# Patient Record
Sex: MALE | Race: WHITE | NOT HISPANIC OR LATINO | URBAN - METROPOLITAN AREA
[De-identification: names, ages, dates, MRNs, and addresses within clinical notes are randomized per-mention and may not be internally consistent; named-entity substitution may affect disease eponyms.]

---

## 2023-09-01 ENCOUNTER — APPOINTMENT (OUTPATIENT)
Dept: RADIOLOGY | Facility: MEDICAL CENTER | Age: 68
DRG: 535 | End: 2023-09-01
Attending: EMERGENCY MEDICINE
Payer: MEDICARE

## 2023-09-01 ENCOUNTER — APPOINTMENT (OUTPATIENT)
Dept: RADIOLOGY | Facility: MEDICAL CENTER | Age: 68
DRG: 535 | End: 2023-09-01
Payer: MEDICARE

## 2023-09-01 ENCOUNTER — HOSPITAL ENCOUNTER (INPATIENT)
Facility: MEDICAL CENTER | Age: 68
LOS: 4 days | DRG: 535 | End: 2023-09-05
Attending: EMERGENCY MEDICINE | Admitting: SURGERY
Payer: MEDICARE

## 2023-09-01 DIAGNOSIS — S32.592A CLOSED BILATERAL FRACTURE OF PUBIC RAMI, INITIAL ENCOUNTER (HCC): ICD-10-CM

## 2023-09-01 DIAGNOSIS — S32.401A CLOSED NONDISPLACED FRACTURE OF RIGHT ACETABULUM, UNSPECIFIED PORTION OF ACETABULUM, INITIAL ENCOUNTER (HCC): ICD-10-CM

## 2023-09-01 DIAGNOSIS — K59.03 DRUG-INDUCED CONSTIPATION: ICD-10-CM

## 2023-09-01 DIAGNOSIS — S32.591A CLOSED BILATERAL FRACTURE OF PUBIC RAMI, INITIAL ENCOUNTER (HCC): ICD-10-CM
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
## 2023-09-01 DIAGNOSIS — T14.90XA TRAUMA: ICD-10-CM

## 2023-09-01 DIAGNOSIS — N50.1 MALE PELVIC HEMATOMA: ICD-10-CM

## 2023-09-01 DIAGNOSIS — S72.401B TYPE I OR II OPEN FRACTURE OF DISTAL END OF RIGHT FEMUR, UNSPECIFIED FRACTURE MORPHOLOGY, INITIAL ENCOUNTER (HCC): ICD-10-CM

## 2023-09-01 PROBLEM — I10 HYPERTENSION: Status: ACTIVE | Noted: 2023-09-01

## 2023-09-01 PROBLEM — T14.8XXA HEMATOMA: Status: ACTIVE | Noted: 2023-09-01

## 2023-09-01 PROBLEM — Z53.09 CONTRAINDICATION TO DEEP VEIN THROMBOSIS (DVT) PROPHYLAXIS: Status: ACTIVE | Noted: 2023-09-01

## 2023-09-01 PROBLEM — E78.5 HYPERLIPIDEMIA: Status: ACTIVE | Noted: 2023-09-01

## 2023-09-01 LAB
ABO + RH BLD: NORMAL
ABO GROUP BLD: NORMAL
ALBUMIN SERPL BCP-MCNC: 4.7 G/DL (ref 3.2–4.9)
ALBUMIN/GLOB SERPL: 1.6 G/DL
ALP SERPL-CCNC: 70 U/L (ref 30–99)
ALT SERPL-CCNC: 70 U/L (ref 2–50)
ANION GAP SERPL CALC-SCNC: 13 MMOL/L (ref 7–16)
AST SERPL-CCNC: 68 U/L (ref 12–45)
BILIRUB SERPL-MCNC: 1.2 MG/DL (ref 0.1–1.5)
BLD GP AB SCN SERPL QL: NORMAL
BUN SERPL-MCNC: 17 MG/DL (ref 8–22)
CALCIUM ALBUM COR SERPL-MCNC: 9.6 MG/DL (ref 8.5–10.5)
CALCIUM SERPL-MCNC: 10.2 MG/DL (ref 8.5–10.5)
CHLORIDE SERPL-SCNC: 100 MMOL/L (ref 96–112)
CO2 SERPL-SCNC: 25 MMOL/L (ref 20–33)
CREAT SERPL-MCNC: 0.91 MG/DL (ref 0.5–1.4)
ERYTHROCYTE [DISTWIDTH] IN BLOOD BY AUTOMATED COUNT: 43.2 FL (ref 35.9–50)
ETHANOL BLD-MCNC: <10.1 MG/DL
GFR SERPLBLD CREATININE-BSD FMLA CKD-EPI: 91 ML/MIN/1.73 M 2
GLOBULIN SER CALC-MCNC: 2.9 G/DL (ref 1.9–3.5)
GLUCOSE SERPL-MCNC: 114 MG/DL (ref 65–99)
HCT VFR BLD AUTO: 49.5 % (ref 42–52)
HGB BLD-MCNC: 15.3 G/DL (ref 14–18)
HGB BLD-MCNC: 17 G/DL (ref 14–18)
HGB BLD-MCNC: 17.2 G/DL (ref 14–18)
MCH RBC QN AUTO: 32.5 PG (ref 27–33)
MCHC RBC AUTO-ENTMCNC: 34.7 G/DL (ref 32.3–36.5)
MCV RBC AUTO: 93.6 FL (ref 81.4–97.8)
PLATELET # BLD AUTO: 230 K/UL (ref 164–446)
PMV BLD AUTO: 10.2 FL (ref 9–12.9)
POTASSIUM SERPL-SCNC: 3.7 MMOL/L (ref 3.6–5.5)
PROT SERPL-MCNC: 7.6 G/DL (ref 6–8.2)
RBC # BLD AUTO: 5.29 M/UL (ref 4.7–6.1)
RH BLD: NORMAL
SODIUM SERPL-SCNC: 138 MMOL/L (ref 135–145)
WBC # BLD AUTO: 10.4 K/UL (ref 4.8–10.8)

## 2023-09-01 PROCEDURE — 36415 COLL VENOUS BLD VENIPUNCTURE: CPT

## 2023-09-01 PROCEDURE — 82077 ASSAY SPEC XCP UR&BREATH IA: CPT

## 2023-09-01 PROCEDURE — 700111 HCHG RX REV CODE 636 W/ 250 OVERRIDE (IP): Performed by: SURGERY

## 2023-09-01 PROCEDURE — 71260 CT THORAX DX C+: CPT

## 2023-09-01 PROCEDURE — 96376 TX/PRO/DX INJ SAME DRUG ADON: CPT

## 2023-09-01 PROCEDURE — A9270 NON-COVERED ITEM OR SERVICE: HCPCS | Performed by: SURGERY

## 2023-09-01 PROCEDURE — 86901 BLOOD TYPING SEROLOGIC RH(D): CPT

## 2023-09-01 PROCEDURE — 99223 1ST HOSP IP/OBS HIGH 75: CPT | Mod: AI | Performed by: SURGERY

## 2023-09-01 PROCEDURE — 99222 1ST HOSP IP/OBS MODERATE 55: CPT | Performed by: ORTHOPAEDIC SURGERY

## 2023-09-01 PROCEDURE — 700117 HCHG RX CONTRAST REV CODE 255: Performed by: EMERGENCY MEDICINE

## 2023-09-01 PROCEDURE — 86900 BLOOD TYPING SEROLOGIC ABO: CPT

## 2023-09-01 PROCEDURE — 700105 HCHG RX REV CODE 258: Performed by: SURGERY

## 2023-09-01 PROCEDURE — 72170 X-RAY EXAM OF PELVIS: CPT

## 2023-09-01 PROCEDURE — 71045 X-RAY EXAM CHEST 1 VIEW: CPT

## 2023-09-01 PROCEDURE — G0390 TRAUMA RESPONS W/HOSP CRITI: HCPCS

## 2023-09-01 PROCEDURE — 86850 RBC ANTIBODY SCREEN: CPT

## 2023-09-01 PROCEDURE — 72131 CT LUMBAR SPINE W/O DYE: CPT

## 2023-09-01 PROCEDURE — 770022 HCHG ROOM/CARE - ICU (200)

## 2023-09-01 PROCEDURE — 700102 HCHG RX REV CODE 250 W/ 637 OVERRIDE(OP): Performed by: SURGERY

## 2023-09-01 PROCEDURE — 72128 CT CHEST SPINE W/O DYE: CPT

## 2023-09-01 PROCEDURE — 85018 HEMOGLOBIN: CPT

## 2023-09-01 PROCEDURE — 700111 HCHG RX REV CODE 636 W/ 250 OVERRIDE (IP): Performed by: EMERGENCY MEDICINE

## 2023-09-01 PROCEDURE — 85027 COMPLETE CBC AUTOMATED: CPT

## 2023-09-01 PROCEDURE — 99291 CRITICAL CARE FIRST HOUR: CPT

## 2023-09-01 PROCEDURE — 73551 X-RAY EXAM OF FEMUR 1: CPT | Mod: RT

## 2023-09-01 PROCEDURE — 80053 COMPREHEN METABOLIC PANEL: CPT

## 2023-09-01 PROCEDURE — 96374 THER/PROPH/DIAG INJ IV PUSH: CPT

## 2023-09-01 RX ORDER — ATORVASTATIN CALCIUM 40 MG/1
40 TABLET, FILM COATED ORAL DAILY
COMMUNITY

## 2023-09-01 RX ORDER — ACETAMINOPHEN 325 MG/1
650 TABLET ORAL EVERY 6 HOURS
Status: DISCONTINUED | OUTPATIENT
Start: 2023-09-01 | End: 2023-09-05 | Stop reason: HOSPADM

## 2023-09-01 RX ORDER — METOPROLOL TARTRATE 50 MG/1
50 TABLET, FILM COATED ORAL EVERY MORNING
COMMUNITY

## 2023-09-01 RX ORDER — AMOXICILLIN 250 MG
1 CAPSULE ORAL
Status: DISCONTINUED | OUTPATIENT
Start: 2023-09-01 | End: 2023-09-05 | Stop reason: HOSPADM

## 2023-09-01 RX ORDER — LISINOPRIL 10 MG/1
10 TABLET ORAL EVERY EVENING
Status: DISCONTINUED | OUTPATIENT
Start: 2023-09-01 | End: 2023-09-05 | Stop reason: HOSPADM

## 2023-09-01 RX ORDER — METAXALONE 800 MG/1
800 TABLET ORAL 3 TIMES DAILY
Status: DISCONTINUED | OUTPATIENT
Start: 2023-09-01 | End: 2023-09-05 | Stop reason: HOSPADM

## 2023-09-01 RX ORDER — HYDROMORPHONE HYDROCHLORIDE 1 MG/ML
INJECTION, SOLUTION INTRAMUSCULAR; INTRAVENOUS; SUBCUTANEOUS
Status: COMPLETED | OUTPATIENT
Start: 2023-09-01 | End: 2023-09-01

## 2023-09-01 RX ORDER — AMLODIPINE BESYLATE 10 MG/1
10 TABLET ORAL DAILY
COMMUNITY

## 2023-09-01 RX ORDER — LISINOPRIL 20 MG/1
20 TABLET ORAL EVERY MORNING
COMMUNITY

## 2023-09-01 RX ORDER — TRIAMTERENE AND HYDROCHLOROTHIAZIDE 37.5; 25 MG/1; MG/1
1 CAPSULE ORAL EVERY MORNING
Status: DISCONTINUED | OUTPATIENT
Start: 2023-09-01 | End: 2023-09-05 | Stop reason: HOSPADM

## 2023-09-01 RX ORDER — AMLODIPINE BESYLATE 10 MG/1
10 TABLET ORAL DAILY
Status: DISCONTINUED | OUTPATIENT
Start: 2023-09-01 | End: 2023-09-05 | Stop reason: HOSPADM

## 2023-09-01 RX ORDER — ENEMA 19; 7 G/133ML; G/133ML
1 ENEMA RECTAL
Status: DISCONTINUED | OUTPATIENT
Start: 2023-09-01 | End: 2023-09-05 | Stop reason: HOSPADM

## 2023-09-01 RX ORDER — DOCUSATE SODIUM 100 MG/1
100 CAPSULE, LIQUID FILLED ORAL 2 TIMES DAILY
Status: DISCONTINUED | OUTPATIENT
Start: 2023-09-01 | End: 2023-09-05 | Stop reason: HOSPADM

## 2023-09-01 RX ORDER — ACETAMINOPHEN 650 MG/1
650 SUPPOSITORY RECTAL EVERY 4 HOURS PRN
Status: DISCONTINUED | OUTPATIENT
Start: 2023-09-06 | End: 2023-09-05 | Stop reason: HOSPADM

## 2023-09-01 RX ORDER — HYDROMORPHONE HYDROCHLORIDE 1 MG/ML
0.5 INJECTION, SOLUTION INTRAMUSCULAR; INTRAVENOUS; SUBCUTANEOUS
Status: DISCONTINUED | OUTPATIENT
Start: 2023-09-01 | End: 2023-09-04

## 2023-09-01 RX ORDER — AMOXICILLIN 250 MG
1 CAPSULE ORAL NIGHTLY
Status: DISCONTINUED | OUTPATIENT
Start: 2023-09-01 | End: 2023-09-05 | Stop reason: HOSPADM

## 2023-09-01 RX ORDER — ONDANSETRON 2 MG/ML
4 INJECTION INTRAMUSCULAR; INTRAVENOUS EVERY 4 HOURS PRN
Status: DISCONTINUED | OUTPATIENT
Start: 2023-09-01 | End: 2023-09-05 | Stop reason: HOSPADM

## 2023-09-01 RX ORDER — TRIAMTERENE AND HYDROCHLOROTHIAZIDE 37.5; 25 MG/1; MG/1
1 CAPSULE ORAL EVERY MORNING
COMMUNITY

## 2023-09-01 RX ORDER — BISACODYL 10 MG
10 SUPPOSITORY, RECTAL RECTAL
Status: DISCONTINUED | OUTPATIENT
Start: 2023-09-01 | End: 2023-09-05 | Stop reason: HOSPADM

## 2023-09-01 RX ORDER — ACETAMINOPHEN 325 MG/1
650 TABLET ORAL EVERY 4 HOURS PRN
Status: DISCONTINUED | OUTPATIENT
Start: 2023-09-01 | End: 2023-09-01

## 2023-09-01 RX ORDER — ACETAMINOPHEN 325 MG/1
650 TABLET ORAL EVERY 4 HOURS PRN
Status: DISCONTINUED | OUTPATIENT
Start: 2023-09-06 | End: 2023-09-05 | Stop reason: HOSPADM

## 2023-09-01 RX ORDER — ACETAMINOPHEN 650 MG/1
650 SUPPOSITORY RECTAL EVERY 4 HOURS PRN
Status: DISCONTINUED | OUTPATIENT
Start: 2023-09-01 | End: 2023-09-01

## 2023-09-01 RX ORDER — METOPROLOL TARTRATE 50 MG/1
50 TABLET, FILM COATED ORAL EVERY MORNING
Status: DISCONTINUED | OUTPATIENT
Start: 2023-09-02 | End: 2023-09-05 | Stop reason: HOSPADM

## 2023-09-01 RX ORDER — OXYCODONE HYDROCHLORIDE 5 MG/1
5 TABLET ORAL
Status: DISCONTINUED | OUTPATIENT
Start: 2023-09-01 | End: 2023-09-05 | Stop reason: HOSPADM

## 2023-09-01 RX ORDER — POLYETHYLENE GLYCOL 3350 17 G/17G
1 POWDER, FOR SOLUTION ORAL 2 TIMES DAILY
Status: DISCONTINUED | OUTPATIENT
Start: 2023-09-01 | End: 2023-09-05 | Stop reason: HOSPADM

## 2023-09-01 RX ORDER — SODIUM CHLORIDE, SODIUM LACTATE, POTASSIUM CHLORIDE, CALCIUM CHLORIDE 600; 310; 30; 20 MG/100ML; MG/100ML; MG/100ML; MG/100ML
INJECTION, SOLUTION INTRAVENOUS CONTINUOUS
Status: DISCONTINUED | OUTPATIENT
Start: 2023-09-01 | End: 2023-09-04

## 2023-09-01 RX ORDER — ONDANSETRON 4 MG/1
4 TABLET, ORALLY DISINTEGRATING ORAL EVERY 4 HOURS PRN
Status: DISCONTINUED | OUTPATIENT
Start: 2023-09-01 | End: 2023-09-05 | Stop reason: HOSPADM

## 2023-09-01 RX ORDER — ACETAMINOPHEN 325 MG/1
650 TABLET ORAL EVERY 6 HOURS PRN
Status: DISCONTINUED | OUTPATIENT
Start: 2023-09-06 | End: 2023-09-05 | Stop reason: HOSPADM

## 2023-09-01 RX ORDER — LISINOPRIL 10 MG/1
10 TABLET ORAL EVERY EVENING
COMMUNITY

## 2023-09-01 RX ORDER — OXYCODONE HYDROCHLORIDE 10 MG/1
10 TABLET ORAL
Status: DISCONTINUED | OUTPATIENT
Start: 2023-09-01 | End: 2023-09-05 | Stop reason: HOSPADM

## 2023-09-01 RX ORDER — HYDROMORPHONE HYDROCHLORIDE 1 MG/ML
.5-1 INJECTION, SOLUTION INTRAMUSCULAR; INTRAVENOUS; SUBCUTANEOUS
Status: DISCONTINUED | OUTPATIENT
Start: 2023-09-01 | End: 2023-09-01

## 2023-09-01 RX ORDER — ATORVASTATIN CALCIUM 40 MG/1
40 TABLET, FILM COATED ORAL DAILY
Status: DISCONTINUED | OUTPATIENT
Start: 2023-09-02 | End: 2023-09-05 | Stop reason: HOSPADM

## 2023-09-01 RX ORDER — GABAPENTIN 100 MG/1
100 CAPSULE ORAL EVERY 8 HOURS
Status: DISCONTINUED | OUTPATIENT
Start: 2023-09-01 | End: 2023-09-05 | Stop reason: HOSPADM

## 2023-09-01 RX ORDER — LISINOPRIL 20 MG/1
20 TABLET ORAL EVERY MORNING
Status: DISCONTINUED | OUTPATIENT
Start: 2023-09-01 | End: 2023-09-05 | Stop reason: HOSPADM

## 2023-09-01 RX ADMIN — GABAPENTIN 100 MG: 100 CAPSULE ORAL at 13:58

## 2023-09-01 RX ADMIN — OXYCODONE HYDROCHLORIDE 10 MG: 10 TABLET ORAL at 22:46

## 2023-09-01 RX ADMIN — ACETAMINOPHEN 650 MG: 325 TABLET, FILM COATED ORAL at 13:59

## 2023-09-01 RX ADMIN — OXYCODONE HYDROCHLORIDE 10 MG: 10 TABLET ORAL at 16:18

## 2023-09-01 RX ADMIN — SODIUM CHLORIDE, POTASSIUM CHLORIDE, SODIUM LACTATE AND CALCIUM CHLORIDE: 600; 310; 30; 20 INJECTION, SOLUTION INTRAVENOUS at 13:57

## 2023-09-01 RX ADMIN — HYDROMORPHONE HYDROCHLORIDE 0.5 MG: 1 INJECTION, SOLUTION INTRAMUSCULAR; INTRAVENOUS; SUBCUTANEOUS at 11:29

## 2023-09-01 RX ADMIN — OXYCODONE HYDROCHLORIDE 10 MG: 10 TABLET ORAL at 19:22

## 2023-09-01 RX ADMIN — HYDROMORPHONE HYDROCHLORIDE 1 MG: 1 INJECTION, SOLUTION INTRAMUSCULAR; INTRAVENOUS; SUBCUTANEOUS at 12:42

## 2023-09-01 RX ADMIN — GABAPENTIN 100 MG: 100 CAPSULE ORAL at 21:23

## 2023-09-01 RX ADMIN — METAXALONE 800 MG: 800 TABLET ORAL at 17:47

## 2023-09-01 RX ADMIN — SODIUM CHLORIDE, POTASSIUM CHLORIDE, SODIUM LACTATE AND CALCIUM CHLORIDE: 600; 310; 30; 20 INJECTION, SOLUTION INTRAVENOUS at 21:26

## 2023-09-01 RX ADMIN — METAXALONE 800 MG: 800 TABLET ORAL at 13:58

## 2023-09-01 RX ADMIN — LISINOPRIL 20 MG: 20 TABLET ORAL at 13:59

## 2023-09-01 RX ADMIN — ACETAMINOPHEN 650 MG: 325 TABLET, FILM COATED ORAL at 23:27

## 2023-09-01 RX ADMIN — SENNOSIDES AND DOCUSATE SODIUM 1 TABLET: 50; 8.6 TABLET ORAL at 21:23

## 2023-09-01 RX ADMIN — IOHEXOL 100 ML: 350 INJECTION, SOLUTION INTRAVENOUS at 11:40

## 2023-09-01 RX ADMIN — HYDROMORPHONE HYDROCHLORIDE 0.5 MG: 1 INJECTION, SOLUTION INTRAMUSCULAR; INTRAVENOUS; SUBCUTANEOUS at 13:36

## 2023-09-01 RX ADMIN — ACETAMINOPHEN 650 MG: 325 TABLET, FILM COATED ORAL at 17:47

## 2023-09-01 ASSESSMENT — PAIN DESCRIPTION - PAIN TYPE
TYPE: ACUTE PAIN

## 2023-09-01 ASSESSMENT — PATIENT HEALTH QUESTIONNAIRE - PHQ9
SUM OF ALL RESPONSES TO PHQ9 QUESTIONS 1 AND 2: 0
2. FEELING DOWN, DEPRESSED, IRRITABLE, OR HOPELESS: NOT AT ALL
1. LITTLE INTEREST OR PLEASURE IN DOING THINGS: NOT AT ALL

## 2023-09-01 ASSESSMENT — FIBROSIS 4 INDEX: FIB4 SCORE: 2.4

## 2023-09-01 NOTE — ED NOTES
Med Rec completed per patient's wife and RX bottles (returned)   Allergies reviewed  No ORAL antibiotics in last 30 days    Patient takes Metoprolol Tartrate ONCE a day. This medications is prescribed as twice a day

## 2023-09-01 NOTE — PROGRESS NOTES
1330 patient arrived in ICU room 932. Patient received 0.5mg for 8 out of 10 pain.    VS:  HR 66  /69  Temp 98.7f, temporal  RR 13  SpO2 95%  Weight 85.3kg      4 Eyes Skin Assessment Completed by SANDY Bridges and Franca COYNE RN.    Head Blanching, pink  Ears Redness and Blanching  Nose WDL  Mouth WDL  Neck WDL  Breast/Chest Bruising right chest  Shoulder Blades Redness and Blanching  Spine WDL  (R) Arm/Elbow/Hand Abrasion abrasion right elbow  (L) Arm/Elbow/Hand  abrasion left elbow, hand  Abdomen WDL  Groin WDL  Scrotum/Coccyx/Buttocks:  Abrasion right buttocks, redness  (R) Leg WDL  (L) Leg Scar above L knee  (R) Heel/Foot/Toe WDL  (L) Heel/Foot/Toe WDL      Devices In Places Blood Pressure Cuff, Pulse Ox, SCD's, and Nasal Cannula      Interventions In Place Gray Ear Foams, NC W/Ear Foams, Sacral Mepilex, TAP System, Pillows, Q2 Turns, Low Air Loss Mattress, and ZFlo Pillow    Possible Skin Injury No    Pictures Uploaded Into Epic N/A  Wound Consult Placed N/A  RN Wound Prevention Protocol Ordered No

## 2023-09-01 NOTE — H&P
CHIEF COMPLAINT: Pelvic fractures after fall from horse.     HISTORY OF PRESENT ILLNESS: The patient is a 59+ year-old White man who was thrown from a horse and sustained injury to the pelvis.  He complains of pain there.  He is hemodynamically stable but CT scan shows a hematoma in the pelvis and active extravasation so trauma evaluation was requested.  Orthopedics has already been consulted.  Patient denies any low back pain, chest pain, shortness of breath, abdominal pain or nausea.  He denies any pain or numbness or weakness in the extremities.  No other complaints    TRIAGE CATEGORY: The patient was triaged as a Trauma Green Activation. An expeditious primary and secondary survey with required adjuncts was conducted. See Trauma Narrator for full details.    PAST MEDICAL HISTORY:  has a past medical history of Hypertension.    PAST SURGICAL HISTORY:  has no past surgical history on file.    ALLERGIES: No Known Allergies    CURRENT MEDICATIONS:   Home Medications       Reviewed by Medhat Lancaster D.O. (Physician) on 09/01/23 at 1306  Med List Status: Complete     Medication Last Dose Status   amLODIPine (NORVASC) 10 MG Tab 9/1/2023 Active   atorvastatin (LIPITOR) 40 MG Tab 9/1/2023 Active   lisinopril (PRINIVIL) 10 MG Tab 8/31/2023 Active   lisinopril (PRINIVIL) 20 MG Tab 9/1/2023 Active   metoprolol tartrate (LOPRESSOR) 50 MG Tab 9/1/2023 Active   multivitamin Tab 9/1/2023 Active   triamterene/hctz (MAXZIDE-25/DYAZIDE) 37.5-25 MG Cap 9/1/2023 Active                  FAMILY HISTORY: family history is not on file.    SOCIAL HISTORY:  reports current alcohol use. William Ohara reports that William Ohara does not use drugs.    REVIEW OF SYSTEMS: Review of systems is remarkable for the following lower abdominal and pelvic pain worsened with moving legs. The remainder of the comprehensive ROS is negative with the exception of the aforementioned HPI, PMH, and PSH bullets in accordance with CMS guideline.    PHYSICAL  "EXAMINATION:      Vital Signs: /67   Pulse 69   Temp 37.2 °C (99 °F) (Temporal)   Resp 18   Ht 1.702 m (5' 7\")   Wt 83.9 kg (185 lb)   SpO2 95%      Physical Exam  Vitals and nursing note reviewed.   HENT:      Head: Normocephalic and atraumatic.      Nose: Nose normal.      Mouth/Throat:      Mouth: Mucous membranes are moist.      Pharynx: Oropharynx is clear.   Eyes:      Extraocular Movements: Extraocular movements intact.      Conjunctiva/sclera: Conjunctivae normal.      Pupils: Pupils are equal, round, and reactive to light.   Cardiovascular:      Rate and Rhythm: Normal rate and regular rhythm.      Pulses: Normal pulses.   Pulmonary:      Effort: Pulmonary effort is normal.      Breath sounds: Normal breath sounds.   Chest:      Chest wall: No tenderness.   Abdominal:      General: There is no distension.      Palpations: Abdomen is soft.      Tenderness: There is no abdominal tenderness.   Musculoskeletal:         General: Normal range of motion.      Cervical back: Normal range of motion and neck supple. No tenderness.      Comments: Tenderness over the pubic bone area without instability or crepitus  No spine tenderness   Skin:     General: Skin is warm and dry.      Coloration: Skin is not pale.   Neurological:      General: No focal deficit present.      Mental Status: William Ohara is alert and oriented to person, place, and time.      Motor: No weakness.       LABORATORY VALUES:   Recent Labs     09/01/23  1128   WBC 10.4   RBC 5.29   HEMOGLOBIN 17.2   HEMATOCRIT 49.5   MCV 93.6   MCH 32.5   MCHC 34.7   RDW 43.2   PLATELETCT 230   MPV 10.2     Recent Labs     09/01/23  1128   SODIUM 138   POTASSIUM 3.7   CHLORIDE 100   CO2 25   GLUCOSE 114*   BUN 17   CREATININE 0.91   CALCIUM 10.2     Recent Labs     09/01/23  1128   ASTSGOT 68*   ALTSGPT 70*   TBILIRUBIN 1.2   ALKPHOSPHAT 70   GLOBULIN 2.9            IMAGING:   CT-LSPINE W/O PLUS RECONS   Final Result      CT of the lumbar spine without " contrast within normal limits.      CT-TSPINE W/O PLUS RECONS   Final Result      1.  Mild dextroscoliosis of the thoracic spine.      CT-CHEST,ABDOMEN,PELVIS WITH   Final Result      1.  No evidence of intrathoracic injury.   2.  No evidence of solid organ injury or bowel injury.   3.  Mild enlargement of the medial limb left adrenal gland, probably related to hyperplasia favored over injury.   4.  There are fractures of the right pubic ramus and inferior greater ring as well as the right acetabulum.   5.  There is associated pelvic hematoma with 2 small foci of areas of active bleeding along the superior posterior margin of the right pubic ramus.      6.  Findings were discussed with MUNIRA PERKINS on 9/1/2023 at 12:13 PM.      DX-FEMUR-1 VIEW RIGHT   Final Result      1.  Fracture of right-sided parasymphyseal pubic ramus.      2.  No evidence of right femur fracture.      DX-PELVIS-1 OR 2 VIEWS   Final Result      1.  Fracture of right-sided parasymphyseal pubic ramus      DX-CHEST-LIMITED (1 VIEW)   Final Result      No acute cardiopulmonary disease.          ASSESSMENT AND PLAN:   This is a male in his 60s with pubic ramus fractures and pelvic hematoma with small amount of extravasation.  No instability at this time.  So we will hold off on angio for now.  Admit to ICU for hemodynamic monitoring, serial exams and serial hemoglobins.  If there is any change in his status then we will discuss case with interventional radiology.  PT and OT have been ordered.  N.p.o. for now.  Hold Lovenox until tomorrow pending hemoglobin trend.  Patient does have hypertension and is on multiple agents so we will only reorder 1 and add them back on slowly as clinically appropriate.  Hematoma  Associated pelvic hematoma with 2 small foci of areas of active bleeding along the superior posterior margin of the right pubic ramus.  Serial laboratory studies and clinical exams.    Closed bilateral fracture of pubic rami (HCC)  There  are fractures of the right pubic ramus and inferior greater ring as well as the right acetabulum.  Non-operative management.  Weight bearing status - Definitive plan pending BLE.  Pedro Mason MD. Orthopedic Surgeon. [facility].    Trauma  Bucked from horse.  Trauma Green Activation.  Medhat Lancaster DO. Trauma Surgery.    Hypertension  Chronic condition treated with metoprolol, amlodipine, triamterene-hctz and lisinopril.  Metoprolol resumed at admission.  Holding maintenance medication during acute traumatic illness.    Hyperlipidemia  Chronic condition treated with atorvastatin.  Resumed maintenance medication on admission.    Contraindication to deep vein thrombosis (DVT) prophylaxis  VTE prophylaxis initially contraindicated secondary to elevated bleeding risk.  9/2 Trauma surveillance venous duplex ultrasonography ordered.    DISPOSITION: Trauma ICU.  Interval Trauma tertiary survey..         ____________________________________     Medhat Lancaster D.O.    DD: 9/1/2023  1:52 PM

## 2023-09-01 NOTE — ASSESSMENT & PLAN NOTE
VTE prophylaxis initially contraindicated secondary to elevated bleeding risk.  Interval Initiation of VTE Prophylaxis: 9/2 Prophylactic dose enoxaparin 30 mg BID initiated.

## 2023-09-01 NOTE — ASSESSMENT & PLAN NOTE
Fractures of the right pubic ramus and inferior greater ring as well as the right acetabulum.  Non-operative management.  Weight bearing status - Touch toe weightbearing RLE.  Pedro Mason MD. Orthopedic Surgeon. [facility].

## 2023-09-01 NOTE — ED PROVIDER NOTES
"ER Provider Note    Scribed for Akira Helm M.D. by Maxwell Del Valle. 9/1/2023   11:43 AM    Primary Care Provider: No primary care provider noted.    CHIEF COMPLAINT  Chief Complaint   Patient presents with    Trauma Green     EXTERNAL RECORDS REVIEWED  Other None    HPI/ROS  LIMITATION TO HISTORY   Select: : None  OUTSIDE HISTORIAN(S):  None    Cassandra Cool is a 67 y.o. adult who presents to the ED for evaluation of Trauma Green onset prior to arrival ago. Per EMS, the patient was riding a horse which was spooked, which resulted in the patient being thrown over the horse's head, impacting on his right side. The patient did not experience loss of consciousness. Patient arrived in a vacuum splint and was medicated with nitrous oxide prior to arrival. The patient is taking medication for hypertension, but denies any use of blood thinners. No known drug allergies noted.    PAST MEDICAL HISTORY  Past Medical History:   Diagnosis Date    Hypertension      SURGICAL HISTORY  History reviewed. No pertinent surgical history.    FAMILY HISTORY  History reviewed. No pertinent family history.    SOCIAL HISTORY   reports current alcohol use. Cassandra Cool reports that Cassandra Cool does not use drugs.    CURRENT MEDICATIONS  Previous Medications    No medications noted     ALLERGIES  No Known Allergies     PHYSICAL EXAM  /90   Pulse 70   Temp 37.2 °C (99 °F) (Temporal)   Resp 20   Ht 1.702 m (5' 7\")   Wt 83.9 kg (185 lb)   SpO2 98%   BMI 28.98 kg/m²    Constitutional: Well developed, Well nourished, Moderate to severe distress,    HENT: Normocephalic, Atraumatic   Eyes: PERRLA, EOMI, Conjunctiva normal, No discharge.   Neck: No tenderness, Supple, No stridor.   Cardiovascular: Normal heart rate, Normal rhythm.   Thorax & Lungs: Clear to auscultation bilaterally, No respiratory distress, No wheezing, No crackles.   Abdomen: Soft, No tenderness, No masses, No pulsatile masses.   Skin: Fine cuts to pelvis, warm, Dry, " No rash.    Musculoskeletal: No major deformities noted.  Intact distal pulses  Neurologic: Awake, alert. Moves all extremities spontaneously.  Extremities: Abrasion to left hand  Psychiatric: Affect normal, Judgment normal, Mood normal.     DIAGNOSTIC STUDIES    Labs:   Results for orders placed or performed during the hospital encounter of 09/01/23   DIAGNOSTIC ALCOHOL   Result Value Ref Range    Diagnostic Alcohol <10.1 <10.1 mg/dL   Comp Metabolic Panel   Result Value Ref Range    Sodium 138 135 - 145 mmol/L    Potassium 3.7 3.6 - 5.5 mmol/L    Chloride 100 96 - 112 mmol/L    Co2 25 20 - 33 mmol/L    Anion Gap 13.0 7.0 - 16.0    Glucose 114 (H) 65 - 99 mg/dL    Bun 17 8 - 22 mg/dL    Creatinine 0.91 0.50 - 1.40 mg/dL    Calcium 10.2 8.5 - 10.5 mg/dL    Correct Calcium 9.6 8.5 - 10.5 mg/dL    AST(SGOT) 68 (H) 12 - 45 U/L    ALT(SGPT) 70 (H) 2 - 50 U/L    Alkaline Phosphatase 70 U/L    Total Bilirubin 1.2 0.1 - 1.5 mg/dL    Albumin 4.7 3.2 - 4.9 g/dL    Total Protein 7.6 6.0 - 8.2 g/dL    Globulin 2.9 1.9 - 3.5 g/dL    A-G Ratio 1.6 g/dL   CBC WITHOUT DIFFERENTIAL   Result Value Ref Range    WBC 10.4 4.8 - 10.8 K/uL    RBC 5.29 4.70 - 6.10 M/uL    Hemoglobin 17.2 14.0 - 18.0 g/dL    Hematocrit 49.5 42.0 - 52.0 %    MCV 93.6 81.4 - 97.8 fL    MCH 32.5 27.0 - 33.0 pg    MCHC 34.7 32.3 - 36.5 g/dL    RDW 43.2 35.9 - 50.0 fL    Platelet Count 230 164 - 446 K/uL    MPV 10.2 9.0 - 12.9 fL   ESTIMATED GFR   Result Value Ref Range    GFR (CKD-EPI) 65 >60 mL/min/1.73 m 2     Radiology:   This attending emergency physician has independently interpreted the diagnostic imaging associated with this visit and is awaiting the final reading from the radiologist.   Preliminary interpretation is a follows: Pelvic Fracture  Radiologist interpretation:   CT-LSPINE W/O PLUS RECONS   Final Result      CT of the lumbar spine without contrast within normal limits.      CT-TSPINE W/O PLUS RECONS   Final Result      1.  Mild  dextroscoliosis of the thoracic spine.      CT-CHEST,ABDOMEN,PELVIS WITH   Final Result      1.  No evidence of intrathoracic injury.   2.  No evidence of solid organ injury or bowel injury.   3.  Mild enlargement of the medial limb left adrenal gland, probably related to hyperplasia favored over injury.   4.  There are fractures of the right pubic ramus and inferior greater ring as well as the right acetabulum.   5.  There is associated pelvic hematoma with 2 small foci of areas of active bleeding along the superior posterior margin of the right pubic ramus.      6.  Findings were discussed with MUNIRA PERKINS on 9/1/2023 at 12:13 PM.      DX-FEMUR-1 VIEW RIGHT   Final Result      1.  Fracture of right-sided parasymphyseal pubic ramus.      2.  No evidence of right femur fracture.      DX-PELVIS-1 OR 2 VIEWS   Final Result      1.  Fracture of right-sided parasymphyseal pubic ramus      DX-CHEST-LIMITED (1 VIEW)   Final Result      No acute cardiopulmonary disease.         COURSE & MEDICAL DECISION MAKING     ED Observation Status? Yes; I am placing the patient in to an observation status due to a diagnostic uncertainty as well as therapeutic intensity. Patient placed in observation status at 11:43 PM, 9/1/2023.     Observation plan is as follows: The patient will be reevaluated following labs and diagnostics.    Upon Reevaluation, the patient's condition has: not improved; and will be escalated to hospitalization.    Patient discharged from ED Observation status at 12:40 (Time) 9/1/2023 (Date).     INITIAL ASSESSMENT, COURSE AND PLAN  Care Narrative: Trauma green, fell off a horse, complaining of right hip pain.  X-ray shows a pelvic fracture, femur appears normal.  The patient had multiple CT scans, that showed small hematomas in the pelvis with some active bleeding, discussed case with IR, discussed the case with Dr. Ramos, discussed case with Dr. Mason.  The patient will need [facility] be [facility] to the  [facility].    11:43 AM - Patient was evaluated at bedside. Ordered for Component Cellular, CBC W/O Diff, CMP, Diagnostic Alcohol, DX-Chest, DX-Pelvis, DX-Femur, -CT-LSpine W/O, CT-Tspine W/O, and CT-Chest w/contrast to evaluate. The patient will be medicated with Dilaudid injection for [place]'s symptoms. Patient verbalizes understanding and support with my plan of care.     12:27 PM Spoke with Radiology about the patient's condition. I paged Orthopedics.    12:34 PM - Patient was reevaluated at bedside. Discussed lab and radiology results with the patient.    12:40 PM - I spoke with Dr. Mason (Orthopedics).    CRITICAL CARE  The very real possibilty of a deterioration of this patient's condition required the highest level of my preparedness for sudden, emergent intervention.  I provided critical care services, which included medication orders, frequent reevaluations of the patient's condition and response to treatment, ordering and reviewing test results, and discussing the case with various consultants.  The critical care time associated with the care of the patient was 35 minutes. Review chart for interventions. This time is exclusive of any other billable procedures.      DISPOSITION AND DISCUSSIONS    I have discussed management of the patient with the following physicians and CHANDRA's: Dr. Lancaster, Dr. Mason    DISPOSITION:  Patient will be hospitalized by Dr. Lancaster in guarded condition.    FINAL DIAGNOSIS  1. Closed nondisplaced fracture of right acetabulum, unspecified portion of acetabulum, initial encounter ([facility])    2. Male pelvic hematoma      The critical care time associated with the care of the patient was 35 minutes.      Maxwell BENTON (Ralph), am scribing for, and in the presence of, Akira Helm M.D..    Electronically signed by: Maxwell Del Valle (Ralph), 9/1/2023    Akira BENTON M.D. personally performed the services described in this documentation, as scribed by Maxwell Del Valle in  my presence, and it is both accurate and complete.      The note accurately reflects work and decisions made by me.  Akira Helm M.D.  9/1/2023  5:47 PM

## 2023-09-01 NOTE — PROGRESS NOTES
Belongings    ID, insurance cared are in belongings closet  Cell phone, charging box, glasses are at bedside    Belongings sent home with pts wife: wallet, remain clothes not cut off, one cowboy boot, home meds.

## 2023-09-01 NOTE — ASSESSMENT & PLAN NOTE
Associated pelvic hematoma with 2 small foci of areas of active bleeding along the superior posterior margin of the right pubic ramus.  Serial hemograms stable.

## 2023-09-01 NOTE — ED NOTES
66 y/o male thrown from horse. Pain to right leg and femur. - head strike -loc. Air splint to rle on arrival. Pt A&O x 4. + pulses limited rom to rle.   Medicated with dilaudid for pain and air splint removed.

## 2023-09-01 NOTE — ED NOTES
PT IS RESTING IN BED. BREATHING IS EVEN AND UNLABORED, SKIN IS WARM AND DRY, VSS, NAD, WILL CONTINUE TO MONITOR.

## 2023-09-02 PROBLEM — R73.9 HYPERGLYCEMIA: Status: ACTIVE | Noted: 2023-09-02

## 2023-09-02 PROBLEM — Z78.9 NO CONTRAINDICATION TO DEEP VEIN THROMBOSIS (DVT) PROPHYLAXIS: Status: ACTIVE | Noted: 2023-09-01

## 2023-09-02 LAB
ALBUMIN SERPL BCP-MCNC: 3.9 G/DL (ref 3.2–4.9)
ALBUMIN/GLOB SERPL: 1.9 G/DL
ALP SERPL-CCNC: 58 U/L (ref 30–99)
ALT SERPL-CCNC: 51 U/L (ref 2–50)
ANION GAP SERPL CALC-SCNC: 12 MMOL/L (ref 7–16)
AST SERPL-CCNC: 35 U/L (ref 12–45)
BASOPHILS # BLD AUTO: 0.3 % (ref 0–1.8)
BASOPHILS # BLD: 0.04 K/UL (ref 0–0.12)
BILIRUB SERPL-MCNC: 1.6 MG/DL (ref 0.1–1.5)
BUN SERPL-MCNC: 22 MG/DL (ref 8–22)
CALCIUM ALBUM COR SERPL-MCNC: 8.5 MG/DL (ref 8.5–10.5)
CALCIUM SERPL-MCNC: 8.4 MG/DL (ref 8.5–10.5)
CHLORIDE SERPL-SCNC: 98 MMOL/L (ref 96–112)
CO2 SERPL-SCNC: 27 MMOL/L (ref 20–33)
CREAT SERPL-MCNC: 1.49 MG/DL (ref 0.5–1.4)
EOSINOPHIL # BLD AUTO: 0.01 K/UL (ref 0–0.51)
EOSINOPHIL NFR BLD: 0.1 % (ref 0–6.9)
ERYTHROCYTE [DISTWIDTH] IN BLOOD BY AUTOMATED COUNT: 46.2 FL (ref 35.9–50)
EST. AVERAGE GLUCOSE BLD GHB EST-MCNC: 100 MG/DL
GFR SERPLBLD CREATININE-BSD FMLA CKD-EPI: 51 ML/MIN/1.73 M 2
GLOBULIN SER CALC-MCNC: 2.1 G/DL (ref 1.9–3.5)
GLUCOSE SERPL-MCNC: 154 MG/DL (ref 65–99)
HBA1C MFR BLD: 5.1 % (ref 4–5.6)
HCT VFR BLD AUTO: 40.5 % (ref 42–52)
HGB BLD-MCNC: 13 G/DL (ref 14–18)
HGB BLD-MCNC: 13.7 G/DL (ref 14–18)
IMM GRANULOCYTES # BLD AUTO: 0.08 K/UL (ref 0–0.11)
IMM GRANULOCYTES NFR BLD AUTO: 0.6 % (ref 0–0.9)
LYMPHOCYTES # BLD AUTO: 1.4 K/UL (ref 1–4.8)
LYMPHOCYTES NFR BLD: 11.2 % (ref 22–41)
MCH RBC QN AUTO: 32.4 PG (ref 27–33)
MCHC RBC AUTO-ENTMCNC: 33.8 G/DL (ref 32.3–36.5)
MCV RBC AUTO: 95.7 FL (ref 81.4–97.8)
MONOCYTES # BLD AUTO: 1.29 K/UL (ref 0–0.85)
MONOCYTES NFR BLD AUTO: 10.3 % (ref 0–13.4)
NEUTROPHILS # BLD AUTO: 9.7 K/UL (ref 1.82–7.42)
NEUTROPHILS NFR BLD: 77.5 % (ref 44–72)
NRBC # BLD AUTO: 0 K/UL
NRBC BLD-RTO: 0 /100 WBC (ref 0–0.2)
PLATELET # BLD AUTO: 216 K/UL (ref 164–446)
PMV BLD AUTO: 10.3 FL (ref 9–12.9)
POTASSIUM SERPL-SCNC: 4.4 MMOL/L (ref 3.6–5.5)
PROT SERPL-MCNC: 6 G/DL (ref 6–8.2)
RBC # BLD AUTO: 4.23 M/UL (ref 4.7–6.1)
SODIUM SERPL-SCNC: 137 MMOL/L (ref 135–145)
WBC # BLD AUTO: 12.5 K/UL (ref 4.8–10.8)

## 2023-09-02 PROCEDURE — 36415 COLL VENOUS BLD VENIPUNCTURE: CPT

## 2023-09-02 PROCEDURE — A9270 NON-COVERED ITEM OR SERVICE: HCPCS | Performed by: SURGERY

## 2023-09-02 PROCEDURE — A9270 NON-COVERED ITEM OR SERVICE: HCPCS | Performed by: NURSE PRACTITIONER

## 2023-09-02 PROCEDURE — 85025 COMPLETE CBC W/AUTO DIFF WBC: CPT

## 2023-09-02 PROCEDURE — 51798 US URINE CAPACITY MEASURE: CPT

## 2023-09-02 PROCEDURE — 97535 SELF CARE MNGMENT TRAINING: CPT

## 2023-09-02 PROCEDURE — 700102 HCHG RX REV CODE 250 W/ 637 OVERRIDE(OP): Performed by: NURSE PRACTITIONER

## 2023-09-02 PROCEDURE — 700105 HCHG RX REV CODE 258: Performed by: SURGERY

## 2023-09-02 PROCEDURE — 770001 HCHG ROOM/CARE - MED/SURG/GYN PRIV*

## 2023-09-02 PROCEDURE — 97162 PT EVAL MOD COMPLEX 30 MIN: CPT

## 2023-09-02 PROCEDURE — 99233 SBSQ HOSP IP/OBS HIGH 50: CPT | Performed by: NURSE PRACTITIONER

## 2023-09-02 PROCEDURE — 700102 HCHG RX REV CODE 250 W/ 637 OVERRIDE(OP): Performed by: SURGERY

## 2023-09-02 PROCEDURE — 80053 COMPREHEN METABOLIC PANEL: CPT

## 2023-09-02 PROCEDURE — 700105 HCHG RX REV CODE 258: Performed by: NURSE PRACTITIONER

## 2023-09-02 PROCEDURE — 700111 HCHG RX REV CODE 636 W/ 250 OVERRIDE (IP): Performed by: NURSE PRACTITIONER

## 2023-09-02 PROCEDURE — 83036 HEMOGLOBIN GLYCOSYLATED A1C: CPT

## 2023-09-02 PROCEDURE — 85018 HEMOGLOBIN: CPT

## 2023-09-02 RX ORDER — CELECOXIB 100 MG/1
100 CAPSULE ORAL DAILY
Status: DISCONTINUED | OUTPATIENT
Start: 2023-09-02 | End: 2023-09-05 | Stop reason: HOSPADM

## 2023-09-02 RX ORDER — SODIUM CHLORIDE, SODIUM LACTATE, POTASSIUM CHLORIDE, AND CALCIUM CHLORIDE .6; .31; .03; .02 G/100ML; G/100ML; G/100ML; G/100ML
1000 INJECTION, SOLUTION INTRAVENOUS ONCE
Status: COMPLETED | OUTPATIENT
Start: 2023-09-02 | End: 2023-09-02

## 2023-09-02 RX ORDER — ENOXAPARIN SODIUM 100 MG/ML
30 INJECTION SUBCUTANEOUS EVERY 12 HOURS
Status: DISCONTINUED | OUTPATIENT
Start: 2023-09-02 | End: 2023-09-05 | Stop reason: HOSPADM

## 2023-09-02 RX ADMIN — DOCUSATE SODIUM 100 MG: 100 CAPSULE, LIQUID FILLED ORAL at 16:44

## 2023-09-02 RX ADMIN — THERA TABS 1 TABLET: TAB at 05:11

## 2023-09-02 RX ADMIN — OXYCODONE HYDROCHLORIDE 10 MG: 10 TABLET ORAL at 05:10

## 2023-09-02 RX ADMIN — METAXALONE 800 MG: 800 TABLET ORAL at 16:44

## 2023-09-02 RX ADMIN — SENNOSIDES AND DOCUSATE SODIUM 1 TABLET: 50; 8.6 TABLET ORAL at 20:26

## 2023-09-02 RX ADMIN — GABAPENTIN 100 MG: 100 CAPSULE ORAL at 20:26

## 2023-09-02 RX ADMIN — DOCUSATE SODIUM 100 MG: 100 CAPSULE, LIQUID FILLED ORAL at 05:11

## 2023-09-02 RX ADMIN — OXYCODONE HYDROCHLORIDE 10 MG: 10 TABLET ORAL at 01:47

## 2023-09-02 RX ADMIN — OXYCODONE HYDROCHLORIDE 5 MG: 5 TABLET ORAL at 11:25

## 2023-09-02 RX ADMIN — SODIUM CHLORIDE, POTASSIUM CHLORIDE, SODIUM LACTATE AND CALCIUM CHLORIDE: 600; 310; 30; 20 INJECTION, SOLUTION INTRAVENOUS at 23:41

## 2023-09-02 RX ADMIN — SODIUM CHLORIDE, POTASSIUM CHLORIDE, SODIUM LACTATE AND CALCIUM CHLORIDE 1000 ML: 600; 310; 30; 20 INJECTION, SOLUTION INTRAVENOUS at 10:23

## 2023-09-02 RX ADMIN — METAXALONE 800 MG: 800 TABLET ORAL at 05:10

## 2023-09-02 RX ADMIN — METAXALONE 800 MG: 800 TABLET ORAL at 11:24

## 2023-09-02 RX ADMIN — CELECOXIB 100 MG: 100 CAPSULE ORAL at 12:57

## 2023-09-02 RX ADMIN — SODIUM CHLORIDE, POTASSIUM CHLORIDE, SODIUM LACTATE AND CALCIUM CHLORIDE: 600; 310; 30; 20 INJECTION, SOLUTION INTRAVENOUS at 07:42

## 2023-09-02 RX ADMIN — GABAPENTIN 100 MG: 100 CAPSULE ORAL at 12:58

## 2023-09-02 RX ADMIN — POLYETHYLENE GLYCOL 3350 1 PACKET: 17 POWDER, FOR SOLUTION ORAL at 16:43

## 2023-09-02 RX ADMIN — ENOXAPARIN SODIUM 30 MG: 100 INJECTION SUBCUTANEOUS at 23:36

## 2023-09-02 RX ADMIN — GABAPENTIN 100 MG: 100 CAPSULE ORAL at 05:11

## 2023-09-02 RX ADMIN — ACETAMINOPHEN 650 MG: 325 TABLET, FILM COATED ORAL at 23:36

## 2023-09-02 RX ADMIN — ACETAMINOPHEN 650 MG: 325 TABLET, FILM COATED ORAL at 05:10

## 2023-09-02 RX ADMIN — ENOXAPARIN SODIUM 30 MG: 100 INJECTION SUBCUTANEOUS at 11:11

## 2023-09-02 RX ADMIN — ATORVASTATIN CALCIUM 40 MG: 40 TABLET, FILM COATED ORAL at 05:11

## 2023-09-02 RX ADMIN — SODIUM CHLORIDE, POTASSIUM CHLORIDE, SODIUM LACTATE AND CALCIUM CHLORIDE: 600; 310; 30; 20 INJECTION, SOLUTION INTRAVENOUS at 12:59

## 2023-09-02 RX ADMIN — ACETAMINOPHEN 650 MG: 325 TABLET, FILM COATED ORAL at 11:25

## 2023-09-02 RX ADMIN — ACETAMINOPHEN 650 MG: 325 TABLET, FILM COATED ORAL at 16:44

## 2023-09-02 ASSESSMENT — LIFESTYLE VARIABLES
TOTAL SCORE: 0
CONSUMPTION TOTAL: NEGATIVE
AVERAGE NUMBER OF DAYS PER WEEK YOU HAVE A DRINK CONTAINING ALCOHOL: 5
TOTAL SCORE: 0
ALCOHOL_USE: YES
HOW MANY TIMES IN THE PAST YEAR HAVE YOU HAD 5 OR MORE DRINKS IN A DAY: 0
EVER FELT BAD OR GUILTY ABOUT YOUR DRINKING: NO
HAVE YOU EVER FELT YOU SHOULD CUT DOWN ON YOUR DRINKING: NO
DOES PATIENT WANT TO STOP DRINKING: NO
ON A TYPICAL DAY WHEN YOU DRINK ALCOHOL HOW MANY DRINKS DO YOU HAVE: 2
EVER HAD A DRINK FIRST THING IN THE MORNING TO STEADY YOUR NERVES TO GET RID OF A HANGOVER: NO
TOTAL SCORE: 0
HAVE PEOPLE ANNOYED YOU BY CRITICIZING YOUR DRINKING: NO

## 2023-09-02 ASSESSMENT — COGNITIVE AND FUNCTIONAL STATUS - GENERAL
WALKING IN HOSPITAL ROOM: A LOT
MOVING TO AND FROM BED TO CHAIR: UNABLE
STANDING UP FROM CHAIR USING ARMS: A LOT
MOVING TO AND FROM BED TO CHAIR: A LITTLE
SUGGESTED CMS G CODE MODIFIER DAILY ACTIVITY: CJ
WALKING IN HOSPITAL ROOM: A LITTLE
HELP NEEDED FOR BATHING: A LITTLE
CLIMB 3 TO 5 STEPS WITH RAILING: A LOT
SUGGESTED CMS G CODE MODIFIER MOBILITY: CL
SUGGESTED CMS G CODE MODIFIER MOBILITY: CL
TURNING FROM BACK TO SIDE WHILE IN FLAT BAD: A LITTLE
MOBILITY SCORE: 14
MOBILITY SCORE: 12
MOVING TO AND FROM BED TO CHAIR: A LITTLE
TURNING FROM BACK TO SIDE WHILE IN FLAT BAD: UNABLE
MOVING FROM LYING ON BACK TO SITTING ON SIDE OF FLAT BED: A LOT
MOVING FROM LYING ON BACK TO SITTING ON SIDE OF FLAT BED: A LOT
DAILY ACTIVITIY SCORE: 21
MOVING FROM LYING ON BACK TO SITTING ON SIDE OF FLAT BED: A LOT
CLIMB 3 TO 5 STEPS WITH RAILING: A LOT
TURNING FROM BACK TO SIDE WHILE IN FLAT BAD: A LITTLE
TOILETING: A LITTLE
DRESSING REGULAR LOWER BODY CLOTHING: A LITTLE
STANDING UP FROM CHAIR USING ARMS: A LITTLE

## 2023-09-02 ASSESSMENT — GAIT ASSESSMENTS
DISTANCE (FEET): 15
GAIT LEVEL OF ASSIST: STANDBY ASSIST
ASSISTIVE DEVICE: FRONT WHEEL WALKER

## 2023-09-02 ASSESSMENT — ENCOUNTER SYMPTOMS
FEVER: 0
DIARRHEA: 0
DIZZINESS: 0
COUGH: 0
VOMITING: 0
NAUSEA: 0
MYALGIAS: 1
ABDOMINAL PAIN: 0
SHORTNESS OF BREATH: 0

## 2023-09-02 ASSESSMENT — PAIN DESCRIPTION - PAIN TYPE
TYPE: ACUTE PAIN

## 2023-09-02 ASSESSMENT — FIBROSIS 4 INDEX: FIB4 SCORE: 1.54

## 2023-09-02 NOTE — THERAPY
"Physical Therapy   Initial Evaluation     Patient Name: Annetta Ohaar  Age:  67 y.o., Sex:  adult  Medical Record #: [id number]  Today's Date: 9/2/2023     Precautions  Precautions: Fall Risk;Toe Touch Weight Bearing Right Lower Extremity  Comments: ROMAT RLE    Assessment  Patient is 67 y.o. male that presented to acute after being thrown from a horse and subsequent R sided acetabular and pelvic fractures. PMHx significant for HTN. He presented to PT with pain, impaired balance given TTWB RLE, and decreased activity tolerance which are limiting his ability to safely perform mobility at [facility]. He mobilized as detailed below; he required extra time for all mobility due to report of pain. Provided education regarding weight bearing precautions, use of AD to maintain TTWB RLE, ROMAT RLE, progression of activity, acute healing process, and pain management strategies; patient verbalized understanding. Anticipate patient will be able to return home following medical DC with assist from social supports but will have to navigate stairs to enter/exit.     Plan    Physical Therapy Initial Treatment Plan   Treatment Plan : Bed Mobility, Equipment, Gait Training, Manual Therapy, Neuro Re-Education / Balance, Self Care / Home Evaluation, Stair Training, Therapeutic Activities, Therapeutic Exercise  Treatment Frequency: 4 Times per Week  Duration: Until Therapy Goals Met    DC Equipment Recommendations:  (anticipate WC for community distances and FWW in home)  Discharge Recommendations: Recommend outpatient physical therapy services to address higher level deficits (following medical clearance)       Subjective    \"I'm shaky because I'm nervous it will hurt like it did before.\"     Objective       09/02/23 1255   Charge Group   PT Evaluation PT Evaluation Mod   PT Self Care / Home Evaluation (Units) 2   Total Time Spent   PT Total Time Yes   PT Evaluation Time Spent (Mins) 30   PT Self Care/Home Evaluation Time Spent (Mins) 25   PT " Total Time Spent (Calculated) 55   Initial Contact Note    Initial Contact Note Order Received and Verified, Physical Therapy Evaluation in Progress with Full Report to Follow.   Precautions   Precautions Fall Risk;Toe Touch Weight Bearing Right Lower Extremity   Comments ROMAT RLE   Vitals   O2 (LPM) 1   O2 Delivery Device Silicone Nasal Cannula   Pain 0 - 10 Group   Location Pelvis   Location Orientation Right   Therapist Pain Assessment During Activity;Nurse Notified   Prior Living Situation   Prior Services None   Housing / Facility 1 Story House   Steps Into Home 3   Steps In Home 2  (but does not need to access)   Equipment Owned None   Lives with - Patient's Self Care Capacity Spouse   Comments Patient reported spouse able to assist as needed   Prior Level of Functional Mobility   Bed Mobility Independent   Transfer Status Independent   Ambulation Independent   Ambulation Distance community   Assistive Devices Used None   Stairs Independent   Comments Patient is normally quite active   Cognition    Cognition / Consciousness WDL   Level of Consciousness Alert   Comments pleasant, cooperative, motivated. somewhat distracted by pain   Active ROM Upper Body   Comments patient moved BUE functionally during assessment   Strength Upper Body   Upper Body Strength  WDL   Active ROM Lower Body    Comments pain limiting R knee extension and hip flexion/extension; others WFL   Strength Lower Body   Comments TTWB RLE; LLE WFL for mobility   Balance Assessment   Sitting Balance (Static) Fair   Sitting Balance (Dynamic) Fair   Standing Balance (Static) Fair   Standing Balance (Dynamic) Fair   Weight Shift Sitting Fair   Weight Shift Standing Fair   Comments used FWW in standing. no overt LOB   Bed Mobility    Supine to Sit Minimal Assist  (pulled on therapist for leverage to bring trunk to upright)   Sit to Supine   (NT, left in recliner)   Scooting Standby Assist  (seated)   Gait Analysis   Gait Level Of Assist Standby  Assist   Assistive Device Front Wheel Walker   Distance (Feet) 15   # of Times Distance was Traveled 2   Deviation   (hop to)   Weight Bearing Status TTWB RLE   Vision Deficits Impacting Mobility NT   Functional Mobility   Sit to Stand Contact Guard Assist   Bed, Chair, Wheelchair Transfer Contact Guard Assist   Toilet Transfers Standby Assist  (stood at toilet)   Transfer Method Stand Step   How much difficulty does the patient currently have...   Turning over in bed (including adjusting bedclothes, sheets and blankets)? 1   Sitting down on and standing up from a chair with arms (e.g., wheelchair, bedside commode, etc.) 2   Moving from lying on back to sitting on the side of the bed? 1   How much help from another person does the patient currently need...   Moving to and from a bed to a chair (including a wheelchair)? 3   Need to walk in a hospital room? 3   Climbing 3-5 steps with a railing? 2   6 clicks Mobility Score 12   Patient / Family Goals    Patient / Family Goal #1 go home   Short Term Goals    Short Term Goal # 1 Patient will move supine <> sitting EOB without bed features with supervision within 6tx in order to get in/out of bed   Short Term Goal # 2 Patient will move sitting <> standing with FWW and supervision within 6tx in order to initiate transfers and gait   Short Term Goal # 3 Patient will ambulate 50ft with FWW and supervision within 6tx in order to access environment   Short Term Goal # 4 Patient will self propel WC 150ft with supervision within 6tx in order to access community   Short Term Goal # 5 Patient will ascend/descend 3 steps with supervision within 6tx in order to enter/exit home   Education Group   Education Provided Role of Physical Therapist;Weight Bearing Precautions;Use of Assistive Device   Role of Physical Therapist Patient Response Patient;Family;Acceptance;Explanation;Verbal Demonstration   Use of Assistive Device Patient Response  Patient;Acceptance;Explanation;Demonstration;Verbal Demonstration;Action Demonstration   Weight Bearing Precautions Patient Response Patient;Family;Acceptance;Explanation;Demonstration;Verbal Demonstration;Action Demonstration   Additional Comments education regarding acute healing and pain management strategies   Physical Therapy Initial Treatment Plan    Treatment Plan  Bed Mobility;Equipment;Gait Training;Manual Therapy;Neuro Re-Education / Balance;Self Care / Home Evaluation;Stair Training;Therapeutic Activities;Therapeutic Exercise   Treatment Frequency 4 Times per Week   Duration Until Therapy Goals Met   Problem List    Problems Pain;Impaired Bed Mobility;Impaired Transfers;Impaired Ambulation;Functional ROM Deficit;Functional Strength Deficit;Impaired Balance;Decreased Activity Tolerance   Anticipated Discharge Equipment and Recommendations   DC Equipment Recommendations   (anticipate WC for community distances and FWW in home)   Discharge Recommendations Recommend outpatient physical therapy services to address higher level deficits  (following medical clearance)   Interdisciplinary Plan of Care Collaboration   IDT Collaboration with  Nursing;Family / Caregiver   Patient Position at End of Therapy Seated;Call Light within Reach;Tray Table within Reach;Phone within Reach;Family / Friend in Room   Collaboration Comments RN aware of visit, response   Session Information   Date / Session Number  9/2-1 (1/4, 9/8)

## 2023-09-02 NOTE — CARE PLAN
Problem: Pain - Standard  Goal: Alleviation of pain or a reduction in pain to the patient’s comfort goal  Note: Educated patient on 0-10 pain scale. Assessing and documenting patient's pain per hospital policy and prn and medicating patient for pain per MAR.       Problem: Hemodynamics  Goal: Patient's hemodynamics, fluid balance and neurologic status will be stable or improve  Note: Continuous vital sign monitoring, q6h hemglobins

## 2023-09-02 NOTE — CARE PLAN
The patient is Watcher - Medium risk of patient condition declining or worsening    Shift Goals  Clinical Goals: Pain control, Q 6 hgb, monitor for bleeding  Patient Goals: pain managment, rest  Family Goals: Information    Progress made toward(s) clinical / shift goals:  Stable hgb, pain control with prn pain meds and non-pharm interventions.   Problem: Pain - Standard  Goal: Alleviation of pain or a reduction in pain to the patient’s comfort goal  Outcome: Progressing     Problem: Knowledge Deficit - Standard  Goal: Patient and family/care givers will demonstrate understanding of plan of care, disease process/condition, diagnostic tests and medications  Outcome: Progressing       Patient is not progressing towards the following goals:

## 2023-09-02 NOTE — PROGRESS NOTES
Trauma / Surgical Daily Progress Note    Date of Service  9/2/2023    Chief Complaint  67 y.o. adult admitted 9/1/2023 with closed bilateral fractures of the pubic [place] after being bucked off horse    Interval Events  Tertiary survey completed with no further findings  Patient does live in [place] with family  Denies any new pain    -Multimodal pain management  -Encourage pulmonary hygiene and I-S to bedside  -Awaiting therapy evaluations  -Plan is for patient to be nonop but if patient is able to mobilize in the next 2 to 3 days may require surgical fixation.  -1 L crystalloid bolus for elevated creatinine and continue IV hydration until repeat labs completed   -Transfer to gregory  -[facility] for chemical DVT prophylaxis    Bowel protocol  Family at bedside and updated      Review of Systems  Review of Systems   Constitutional:  Negative for fever and malaise/fatigue.   Respiratory:  Negative for cough and shortness of breath.    Gastrointestinal:  Negative for abdominal pain, diarrhea, nausea and vomiting.   Genitourinary: Negative.    Musculoskeletal:  Positive for joint pain and myalgias.   Neurological:  Negative for dizziness.        Vital Signs  Temp:  [35.9 °C (96.6 °F)-37.2 °C (99 °F)] 36.5 °C (97.7 °F)  Pulse:  [58-86] 62  Resp:  [10-31] 10  BP: ()/() 111/55  SpO2:  [92 %-98 %] 92 %    Physical Exam  Physical Exam  Vitals and nursing note reviewed. Exam conducted with a chaperone present.   Constitutional:       Appearance: Normal appearance.   HENT:      Head: Normocephalic and atraumatic.      Nose: Nose normal.      Mouth/Throat:      Mouth: Mucous membranes are moist.   Eyes:      General:         Right eye: No discharge.         Left eye: No discharge.      Conjunctiva/sclera: Conjunctivae normal.   Cardiovascular:      Rate and Rhythm: Normal rate and regular rhythm.      Pulses: Normal pulses.   Pulmonary:      Effort: Pulmonary effort is normal.      Breath sounds: Normal breath  sounds.      Comments: Supplemental oxygen   Chest:      Chest wall: No tenderness.   Abdominal:      General: There is no distension.      Palpations: Abdomen is soft.      Tenderness: There is no abdominal tenderness.   Musculoskeletal:         General: Tenderness present. Normal range of motion.      Cervical back: Normal range of motion and neck supple. No tenderness.      Comments: Tenderness over the pubic bone area without instability or crepitus  No spine tenderness   Skin:     General: Skin is warm and dry.      Coloration: Skin is not pale.   Neurological:      General: No focal deficit present.      Mental Status: William Ohara is alert and oriented to person, place, and time.      Motor: No weakness.   Psychiatric:         Mood and Affect: Mood normal.         Behavior: Behavior normal.         Laboratory  Recent Results (from the past 24 hour(s))   DIAGNOSTIC ALCOHOL    Collection Time: 09/01/23 11:28 AM   Result Value Ref Range    Diagnostic Alcohol <10.1 <10.1 mg/dL   Comp Metabolic Panel    Collection Time: 09/01/23 11:28 AM   Result Value Ref Range    Sodium 138 135 - 145 mmol/L    Potassium 3.7 3.6 - 5.5 mmol/L    Chloride 100 96 - 112 mmol/L    Co2 25 20 - 33 mmol/L    Anion Gap 13.0 7.0 - 16.0    Glucose 114 (H) 65 - 99 mg/dL    Bun 17 8 - 22 mg/dL    Creatinine 0.91 0.50 - 1.40 mg/dL    Calcium 10.2 8.5 - 10.5 mg/dL    Correct Calcium 9.6 8.5 - 10.5 mg/dL    AST(SGOT) 68 (H) 12 - 45 U/L    ALT(SGPT) 70 (H) 2 - 50 U/L    Alkaline Phosphatase 70 U/L    Total Bilirubin 1.2 0.1 - 1.5 mg/dL    Albumin 4.7 3.2 - 4.9 g/dL    Total Protein 7.6 6.0 - 8.2 g/dL    Globulin 2.9 1.9 - 3.5 g/dL    A-G Ratio 1.6 g/dL   CBC WITHOUT DIFFERENTIAL    Collection Time: 09/01/23 11:28 AM   Result Value Ref Range    WBC 10.4 4.8 - 10.8 K/uL    RBC 5.29 4.70 - 6.10 M/uL    Hemoglobin 17.2 14.0 - 18.0 g/dL    Hematocrit 49.5 42.0 - 52.0 %    MCV 93.6 81.4 - 97.8 fL    MCH 32.5 27.0 - 33.0 pg    MCHC 34.7 32.3 - 36.5 g/dL     RDW 43.2 35.9 - 50.0 fL    Platelet Count 230 164 - 446 K/uL    MPV 10.2 9.0 - 12.9 fL   COD - Adult (Type and Screen)    Collection Time: 09/01/23 11:28 AM   Result Value Ref Range    ABO Grouping Only B     Rh Grouping Only NEG     Antibody Screen-Cod NEG    ESTIMATED GFR    Collection Time: 09/01/23 11:28 AM   Result Value Ref Range    GFR (CKD-EPI) 91 >60 mL/min/1.73 m 2   ABO Rh Confirm    Collection Time: 09/01/23  2:23 PM   Result Value Ref Range    ABO Rh Confirm B NEG    Hemoglobin - Q6 hours x4    Collection Time: 09/01/23  2:23 PM   Result Value Ref Range    Hemoglobin 17.0 14.0 - 18.0 g/dL   Hemoglobin - Q6 hours x4    Collection Time: 09/01/23  8:00 PM   Result Value Ref Range    Hemoglobin 15.3 14.0 - 18.0 g/dL   CBC with Differential: Tomorrow AM    Collection Time: 09/02/23  2:00 AM   Result Value Ref Range    WBC 12.5 (H) 4.8 - 10.8 K/uL    RBC 4.23 (L) 4.70 - 6.10 M/uL    Hemoglobin 13.7 (L) 14.0 - 18.0 g/dL    Hematocrit 40.5 (L) 42.0 - 52.0 %    MCV 95.7 81.4 - 97.8 fL    MCH 32.4 27.0 - 33.0 pg    MCHC 33.8 32.3 - 36.5 g/dL    RDW 46.2 35.9 - 50.0 fL    Platelet Count 216 164 - 446 K/uL    MPV 10.3 9.0 - 12.9 fL    Neutrophils-Polys 77.50 (H) 44.00 - 72.00 %    Lymphocytes 11.20 (L) 22.00 - 41.00 %    Monocytes 10.30 0.00 - 13.40 %    Eosinophils 0.10 0.00 - 6.90 %    Basophils 0.30 0.00 - 1.80 %    Immature Granulocytes 0.60 0.00 - 0.90 %    Nucleated RBC 0.00 0.00 - 0.20 /100 WBC    Neutrophils (Absolute) 9.70 (H) 1.82 - 7.42 K/uL    Lymphs (Absolute) 1.40 1.00 - 4.80 K/uL    Monos (Absolute) 1.29 (H) 0.00 - 0.85 K/uL    Eos (Absolute) 0.01 0.00 - 0.51 K/uL    Baso (Absolute) 0.04 0.00 - 0.12 K/uL    Immature Granulocytes (abs) 0.08 0.00 - 0.11 K/uL    NRBC (Absolute) 0.00 K/uL   Comp Metabolic Panel (CMP): Tomorrow AM    Collection Time: 09/02/23  2:00 AM   Result Value Ref Range    Sodium 137 135 - 145 mmol/L    Potassium 4.4 3.6 - 5.5 mmol/L    Chloride 98 96 - 112 mmol/L    Co2 27 20 -  33 mmol/L    Anion Gap 12.0 7.0 - 16.0    Glucose 154 (H) 65 - 99 mg/dL    Bun 22 8 - 22 mg/dL    Creatinine 1.49 (H) 0.50 - 1.40 mg/dL    Calcium 8.4 (L) 8.5 - 10.5 mg/dL    Correct Calcium 8.5 8.5 - 10.5 mg/dL    AST(SGOT) 35 12 - 45 U/L    ALT(SGPT) 51 (H) 2 - 50 U/L    Alkaline Phosphatase 58 U/L    Total Bilirubin 1.6 (H) 0.1 - 1.5 mg/dL    Albumin 3.9 3.2 - 4.9 g/dL    Total Protein 6.0 6.0 - 8.2 g/dL    Globulin 2.1 1.9 - 3.5 g/dL    A-G Ratio 1.9 g/dL   ESTIMATED GFR    Collection Time: 09/02/23  2:00 AM   Result Value Ref Range    GFR (CKD-EPI) 51 (A) >60 mL/min/1.73 m 2       Fluids    Intake/Output Summary (Last 24 hours) at 9/2/2023 0857  Last data filed at 9/2/2023 0600  Gross per 24 hour   Intake 2132.82 ml   Output 860 ml   Net 1272.82 ml       Core Measures & Quality Metrics  Labs reviewed, Medications reviewed and Radiology images reviewed  Ngo catheter: No Ngo      DVT Prophylaxis: Enoxaparin (Lovenox)  DVT prophylaxis - mechanical: SCDs      Assessed for rehab: Patient was assess for and/or received rehabilitation services during this hospitalization    RAP Score Total: 7    CAGE Results: not completed Blood Alcohol>0.08: no       Assessment/Plan  * Trauma- (present on admission)  Assessment & Plan  Bucked from horse.  Trauma Green Activation.  Medhat Lancaster DO. Trauma Surgery.    Hematoma- (present on admission)  Assessment & Plan  Associated pelvic hematoma with 2 small foci of areas of active bleeding along the superior posterior margin of the right pubic ramus.  Serial laboratory studies and clinical exams.    Closed bilateral fracture of pubic rami (HCC)- (present on admission)  Assessment & Plan  There are fractures of the right pubic ramus and inferior greater ring as well as the right acetabulum.  Non-operative management.  Weight bearing status - Definitive plan pending BLE.  Pedro Mason MD. Orthopedic Surgeon. [facility].    Contraindication to deep vein thrombosis  (DVT) prophylaxis- (present on admission)  Assessment & Plan  VTE prophylaxis initially contraindicated secondary to elevated bleeding risk.  9/2 Trauma surveillance venous duplex ultrasonography ordered.    Hypertension- (present on admission)  Assessment & Plan  Chronic condition treated with metoprolol, amlodipine, triamterene-hctz and lisinopril.  Metoprolol resumed at admission.  Holding maintenance medication during acute traumatic illness.    Hyperlipidemia- (present on admission)  Assessment & Plan  Chronic condition treated with atorvastatin.  Resumed maintenance medication on admission.        Discussed patient condition with Family, RN, Charge nurse / hot rounds, Patient, and trauma surgery.   Dr. Sierra

## 2023-09-02 NOTE — CONSULTS
Ortho team is present at bedside prior to consultation.    Date of Service: 09/01/23    Annetta Ohara was seen today in consultation for right-sided acetabular and pelvic fractures at the request of Dr. Helm    CC: Right pelvic fracture    HPI: Annetta Ohara is a 67 y.o. adult who presents with complaints of pain to right hip and groin.  This started earlier today after he was bucked off a horse.  He landed on his right side.  He had pain over the right hip area.  Since then he has also had some pain more in the front of the pelvis.  He denies any head injury or injury to his upper extremities.  The pain is 6/10 and is described as sharp.  The pain is made worse by palpation of the area and made better by rest and immobilization.    PMH:   Past Medical History:   Diagnosis Date    Hypertension        PSH: History reviewed. No pertinent surgical history.    FH: History reviewed. No pertinent family history.    SH:   Social History     Socioeconomic History    Marital status: Not on file     Spouse name: Not on file    Number of children: Not on file    Years of education: Not on file    Highest education level: Not on file   Occupational History    Not on file   Tobacco Use    Smoking status: Not on file    Smokeless tobacco: Not on file   Vaping Use    Vaping Use: Never used   Substance and Sexual Activity    Alcohol use: Yes     Comment: 1/day    Drug use: Never    Sexual activity: Not on file   Other Topics Concern    Not on file   Social History Narrative    Not on file     Social Determinants of Health     Financial Resource Strain: Not on file   Food Insecurity: Not on file   Transportation Needs: Not on file   Physical Activity: Not on file   Stress: Not on file   Social Connections: Not on file   Intimate Partner Violence: Not on file   Housing Stability: Not on file       ROS: In review of the following systems: Constitutional, Eyes, ENT, Cardiovascular,Respiratory, GI, , Musculoskeletal, Skin, Neuro,  "Psych, Hematologic, Endocrine, Allergic; no pertinent findings were found related to the chief complaint and orthopedic injury     BP (!) 142/105   Pulse 69   Temp 37.1 °C (98.7 °F) (Temporal)   Resp (!) 21   Ht 1.702 m (5' 7\")   Wt 85.3 kg (188 lb 0.8 oz)   SpO2 96%     Physical Exam:  General: Well nourished, well developed, age appropriate appearance   HEENT: Normocephalic, atraumatic  Psych: Normal mood and affect  Neck: Supple, no pain to motion  Chest/Pulmonary: breathing unlabored, no audible wheezing  Cardio: Regular heart rate and rhythm  Neuro: Sensation grossly intact to BUE and BLE, moving all four extremities  Skin: Intact with no full thickness abrasions or lacerations  MSK: Right hip can be ranged without crepitus or severe pain.  No tenderness nor effusion at the knee leg or ankle.  The left lower extremity bilateral upper EXTR are atraumatic and nontender to palpation range of motion.  Normal sensation to bilateral lower extremities.  Normal capillary refill and perfusion to the distal extremities    Imaging and labs: I independently reviewed and interpreted the CT scan of the pelvis and it shows a transverse acetabular fracture on the right side that is nondisplaced.  There is also a parasymphyseal ramus fracture on the right side    Recent Labs     09/01/23  1128 09/01/23  1423   WBC 10.4  --    RBC 5.29  --    HEMOGLOBIN 17.2 17.0   HEMATOCRIT 49.5  --    MCV 93.6  --    MCH 32.5  --    RDW 43.2  --    PLATELETCT 230  --    MPV 10.2  --        Assessment:   1. Closed nondisplaced fracture of right acetabulum, unspecified portion of acetabulum, initial encounter ([facility])        2. Male pelvic hematoma                            I discussed the diagnosis and findings with the patient at length.  I reviewed possible non operative and operative interventions and the risks and benefits of each of these.  William Ohara had a chance to ask questions and all of these were answered to William Ohara's " satisfaction.        Plan:  Initial nonoperative management with toe-touch weightbearing to the right lower extremity.  Range of motion as tolerated.  PT/OT  If unable to mobilize due to pain after 2 or 3 days I can discuss percutaneous fixation options to further stabilize the pelvis  If he mobilizes well, repeat x-rays in the office in 7 to 10 days  We will start to increase weightbearing between 4 to 6 weeks.

## 2023-09-02 NOTE — PROGRESS NOTES
Patient arrived Via bed with transport. Pt A&O X4, 1L via NC with saturation greater than 90%. Pt verbalizes 0/10 pain at this time. Previous assessment reviewed, agree with previous assessment. Right pedal pulse +1, left pedal pulse +2. Bilateral cap refill less than 3 seconds. Clear lung sounds. All questions answered. Patient educated with plan of care. Call light and personal belongings in reach. Family and physical therapy at bedside.

## 2023-09-02 NOTE — PROGRESS NOTES
Patient left with transport and 3 family members. All belongings present, Patient (Aox4) and patient wife agree.  Report given and all questions and concerns answered at that time.

## 2023-09-03 PROBLEM — R79.89 ELEVATED SERUM CREATININE: Status: ACTIVE | Noted: 2023-09-03

## 2023-09-03 PROBLEM — Z02.9 DISCHARGE PLANNING ISSUES: Status: ACTIVE | Noted: 2023-09-03

## 2023-09-03 LAB
ANION GAP SERPL CALC-SCNC: 9 MMOL/L (ref 7–16)
BASOPHILS # BLD AUTO: 0.7 % (ref 0–1.8)
BASOPHILS # BLD: 0.06 K/UL (ref 0–0.12)
BUN SERPL-MCNC: 37 MG/DL (ref 8–22)
CALCIUM SERPL-MCNC: 8.2 MG/DL (ref 8.5–10.5)
CHLORIDE SERPL-SCNC: 100 MMOL/L (ref 96–112)
CO2 SERPL-SCNC: 27 MMOL/L (ref 20–33)
CREAT SERPL-MCNC: 1.71 MG/DL (ref 0.5–1.4)
EOSINOPHIL # BLD AUTO: 0.33 K/UL (ref 0–0.51)
EOSINOPHIL NFR BLD: 4.1 % (ref 0–6.9)
ERYTHROCYTE [DISTWIDTH] IN BLOOD BY AUTOMATED COUNT: 45.6 FL (ref 35.9–50)
GFR SERPLBLD CREATININE-BSD FMLA CKD-EPI: 43 ML/MIN/1.73 M 2
GLUCOSE SERPL-MCNC: 116 MG/DL (ref 65–99)
HCT VFR BLD AUTO: 31 % (ref 42–52)
HGB BLD-MCNC: 10.5 G/DL (ref 14–18)
IMM GRANULOCYTES # BLD AUTO: 0.04 K/UL (ref 0–0.11)
IMM GRANULOCYTES NFR BLD AUTO: 0.5 % (ref 0–0.9)
LYMPHOCYTES # BLD AUTO: 1.58 K/UL (ref 1–4.8)
LYMPHOCYTES NFR BLD: 19.6 % (ref 22–41)
MCH RBC QN AUTO: 32.4 PG (ref 27–33)
MCHC RBC AUTO-ENTMCNC: 33.9 G/DL (ref 32.3–36.5)
MCV RBC AUTO: 95.7 FL (ref 81.4–97.8)
MONOCYTES # BLD AUTO: 0.97 K/UL (ref 0–0.85)
MONOCYTES NFR BLD AUTO: 12 % (ref 0–13.4)
NEUTROPHILS # BLD AUTO: 5.08 K/UL (ref 1.82–7.42)
NEUTROPHILS NFR BLD: 63.1 % (ref 44–72)
NRBC # BLD AUTO: 0 K/UL
NRBC BLD-RTO: 0 /100 WBC (ref 0–0.2)
PLATELET # BLD AUTO: 150 K/UL (ref 164–446)
PMV BLD AUTO: 10.5 FL (ref 9–12.9)
POTASSIUM SERPL-SCNC: 3.9 MMOL/L (ref 3.6–5.5)
RBC # BLD AUTO: 3.24 M/UL (ref 4.7–6.1)
SODIUM SERPL-SCNC: 136 MMOL/L (ref 135–145)
WBC # BLD AUTO: 8.1 K/UL (ref 4.8–10.8)

## 2023-09-03 PROCEDURE — 80048 BASIC METABOLIC PNL TOTAL CA: CPT

## 2023-09-03 PROCEDURE — A9270 NON-COVERED ITEM OR SERVICE: HCPCS | Performed by: SURGERY

## 2023-09-03 PROCEDURE — A9270 NON-COVERED ITEM OR SERVICE: HCPCS | Performed by: NURSE PRACTITIONER

## 2023-09-03 PROCEDURE — 700111 HCHG RX REV CODE 636 W/ 250 OVERRIDE (IP): Performed by: NURSE PRACTITIONER

## 2023-09-03 PROCEDURE — 700105 HCHG RX REV CODE 258

## 2023-09-03 PROCEDURE — 97165 OT EVAL LOW COMPLEX 30 MIN: CPT

## 2023-09-03 PROCEDURE — 700102 HCHG RX REV CODE 250 W/ 637 OVERRIDE(OP): Performed by: NURSE PRACTITIONER

## 2023-09-03 PROCEDURE — 36415 COLL VENOUS BLD VENIPUNCTURE: CPT

## 2023-09-03 PROCEDURE — 51798 US URINE CAPACITY MEASURE: CPT

## 2023-09-03 PROCEDURE — 85025 COMPLETE CBC W/AUTO DIFF WBC: CPT

## 2023-09-03 PROCEDURE — 99232 SBSQ HOSP IP/OBS MODERATE 35: CPT

## 2023-09-03 PROCEDURE — 700105 HCHG RX REV CODE 258: Performed by: NURSE PRACTITIONER

## 2023-09-03 PROCEDURE — 700102 HCHG RX REV CODE 250 W/ 637 OVERRIDE(OP): Performed by: SURGERY

## 2023-09-03 PROCEDURE — 770001 HCHG ROOM/CARE - MED/SURG/GYN PRIV*

## 2023-09-03 PROCEDURE — 97116 GAIT TRAINING THERAPY: CPT

## 2023-09-03 PROCEDURE — 97535 SELF CARE MNGMENT TRAINING: CPT

## 2023-09-03 RX ORDER — SODIUM CHLORIDE, SODIUM LACTATE, POTASSIUM CHLORIDE, AND CALCIUM CHLORIDE .6; .31; .03; .02 G/100ML; G/100ML; G/100ML; G/100ML
1000 INJECTION, SOLUTION INTRAVENOUS ONCE
Status: COMPLETED | OUTPATIENT
Start: 2023-09-03 | End: 2023-09-03

## 2023-09-03 RX ADMIN — ACETAMINOPHEN 650 MG: 325 TABLET, FILM COATED ORAL at 17:31

## 2023-09-03 RX ADMIN — OXYCODONE HYDROCHLORIDE 5 MG: 5 TABLET ORAL at 13:59

## 2023-09-03 RX ADMIN — SODIUM CHLORIDE, POTASSIUM CHLORIDE, SODIUM LACTATE AND CALCIUM CHLORIDE 1000 ML: 600; 310; 30; 20 INJECTION, SOLUTION INTRAVENOUS at 07:58

## 2023-09-03 RX ADMIN — POLYETHYLENE GLYCOL 3350 1 PACKET: 17 POWDER, FOR SOLUTION ORAL at 04:53

## 2023-09-03 RX ADMIN — ACETAMINOPHEN 650 MG: 325 TABLET, FILM COATED ORAL at 04:54

## 2023-09-03 RX ADMIN — BISACODYL 10 MG: 10 SUPPOSITORY RECTAL at 22:23

## 2023-09-03 RX ADMIN — ENOXAPARIN SODIUM 30 MG: 100 INJECTION SUBCUTANEOUS at 12:03

## 2023-09-03 RX ADMIN — ACETAMINOPHEN 650 MG: 325 TABLET, FILM COATED ORAL at 23:57

## 2023-09-03 RX ADMIN — CELECOXIB 100 MG: 100 CAPSULE ORAL at 04:54

## 2023-09-03 RX ADMIN — METOPROLOL TARTRATE 50 MG: 50 TABLET, FILM COATED ORAL at 04:54

## 2023-09-03 RX ADMIN — ENOXAPARIN SODIUM 30 MG: 100 INJECTION SUBCUTANEOUS at 22:23

## 2023-09-03 RX ADMIN — METAXALONE 800 MG: 800 TABLET ORAL at 12:03

## 2023-09-03 RX ADMIN — GABAPENTIN 100 MG: 100 CAPSULE ORAL at 13:59

## 2023-09-03 RX ADMIN — METAXALONE 800 MG: 800 TABLET ORAL at 17:31

## 2023-09-03 RX ADMIN — GABAPENTIN 100 MG: 100 CAPSULE ORAL at 22:23

## 2023-09-03 RX ADMIN — ACETAMINOPHEN 650 MG: 325 TABLET, FILM COATED ORAL at 12:03

## 2023-09-03 RX ADMIN — ATORVASTATIN CALCIUM 40 MG: 40 TABLET, FILM COATED ORAL at 04:54

## 2023-09-03 RX ADMIN — METAXALONE 800 MG: 800 TABLET ORAL at 04:54

## 2023-09-03 RX ADMIN — GABAPENTIN 100 MG: 100 CAPSULE ORAL at 04:54

## 2023-09-03 RX ADMIN — SODIUM CHLORIDE, POTASSIUM CHLORIDE, SODIUM LACTATE AND CALCIUM CHLORIDE: 600; 310; 30; 20 INJECTION, SOLUTION INTRAVENOUS at 15:47

## 2023-09-03 RX ADMIN — THERA TABS 1 TABLET: TAB at 04:54

## 2023-09-03 RX ADMIN — SENNOSIDES AND DOCUSATE SODIUM 1 TABLET: 50; 8.6 TABLET ORAL at 22:22

## 2023-09-03 RX ADMIN — OXYCODONE HYDROCHLORIDE 5 MG: 5 TABLET ORAL at 22:23

## 2023-09-03 RX ADMIN — DOCUSATE SODIUM 100 MG: 100 CAPSULE, LIQUID FILLED ORAL at 17:31

## 2023-09-03 RX ADMIN — DOCUSATE SODIUM 100 MG: 100 CAPSULE, LIQUID FILLED ORAL at 04:53

## 2023-09-03 RX ADMIN — LISINOPRIL 20 MG: 20 TABLET ORAL at 04:54

## 2023-09-03 ASSESSMENT — ENCOUNTER SYMPTOMS
MYALGIAS: 1
VOMITING: 0
SHORTNESS OF BREATH: 0
ABDOMINAL PAIN: 1
NAUSEA: 0
DIZZINESS: 0
FEVER: 0

## 2023-09-03 ASSESSMENT — COPD QUESTIONNAIRES
DURING THE PAST 4 WEEKS HOW MUCH DID YOU FEEL SHORT OF BREATH: NONE/LITTLE OF THE TIME
HAVE YOU SMOKED AT LEAST 100 CIGARETTES IN YOUR ENTIRE LIFE: NO/DON'T KNOW
DO YOU EVER COUGH UP ANY MUCUS OR PHLEGM?: NO/ONLY WITH OCCASIONAL COLDS OR INFECTIONS
COPD SCREENING SCORE: 2

## 2023-09-03 ASSESSMENT — COGNITIVE AND FUNCTIONAL STATUS - GENERAL
SUGGESTED CMS G CODE MODIFIER DAILY ACTIVITY: CJ
MOVING FROM LYING ON BACK TO SITTING ON SIDE OF FLAT BED: A LOT
CLIMB 3 TO 5 STEPS WITH RAILING: A LOT
DRESSING REGULAR LOWER BODY CLOTHING: A LITTLE
STANDING UP FROM CHAIR USING ARMS: A LITTLE
WALKING IN HOSPITAL ROOM: A LITTLE
TURNING FROM BACK TO SIDE WHILE IN FLAT BAD: A LOT
MOBILITY SCORE: 14
SUGGESTED CMS G CODE MODIFIER MOBILITY: CL
HELP NEEDED FOR BATHING: A LITTLE
DAILY ACTIVITIY SCORE: 22
MOVING TO AND FROM BED TO CHAIR: A LOT

## 2023-09-03 ASSESSMENT — ACTIVITIES OF DAILY LIVING (ADL): TOILETING: INDEPENDENT

## 2023-09-03 ASSESSMENT — GAIT ASSESSMENTS
DISTANCE (FEET): 35
ASSISTIVE DEVICE: FRONT WHEEL WALKER
GAIT LEVEL OF ASSIST: SUPERVISED
DEVIATION: OTHER (COMMENT)

## 2023-09-03 ASSESSMENT — PAIN DESCRIPTION - PAIN TYPE
TYPE: ACUTE PAIN

## 2023-09-03 NOTE — THERAPY
Physical Therapy   Daily Treatment     Patient Name: Annetta Ohara  Age:  67 y.o., Sex:  male  Medical Record #: [id number]  Today's Date: 9/3/2023     Precautions  Precautions: Fall Risk;Toe Touch Weight Bearing Right Lower Extremity  Comments: ROMAT RLE    Assessment    Pt seen for follow up PT tx. Cues provided throughout for improved compliance with TTWB while ambulating with FWW. Discussed use of FWW for household distances and WC for community. Family at bedside reported they plan to get him a walker and they have access to a ramp to put at the entrance of the home. Discussed HHPT to further address mobility in the home.     Plan    Treatment Plan Status: Continue Current Treatment Plan  Type of Treatment: Bed Mobility, Equipment, Gait Training, Manual Therapy, Neuro Re-Education / Balance, Self Care / Home Evaluation, Stair Training, Therapeutic Activities, Therapeutic Exercise  Treatment Frequency: 4 Times per Week  Treatment Duration: Until Therapy Goals Met    DC Equipment Recommendations: Wheelchair  Discharge Recommendations: Recommend home health for continued physical therapy services         09/03/23 1021   Vitals   O2 (LPM) 2   O2 Delivery Device Nasal Cannula   Other Treatments   Other Treatments Provided spent time discussing dc home and equipment needed. Family at bedside and reported they have access to a ramp   Balance   Sitting Balance (Static) Fair   Sitting Balance (Dynamic) Fair   Standing Balance (Static) Fair   Standing Balance (Dynamic) Fair   Weight Shift Sitting Fair   Weight Shift Standing Poor   Skilled Intervention Verbal Cuing;Compensatory Strategies   Comments FWW   Bed Mobility    Comments in chair pre and post   Gait Analysis   Gait Level Of Assist Supervised   Assistive Device Front Wheel Walker   Distance (Feet) 35   # of Times Distance was Traveled 1   Deviation Other (Comment)  (cues for TTWB)   Weight Bearing Status TTWB RLE   Skilled Intervention Verbal Cuing;Compensatory  Strategies   Comments cues for TTWB   Functional Mobility   Sit to Stand Supervised   Bed, Chair, Wheelchair Transfer Supervised   Toilet Transfers Supervised   Transfer Method Stand Step   Mobility in room and hallway with FWW   Skilled Intervention Verbal Cuing   Patient / Family Goals    Patient / Family Goal #1 go home   Goal #1 Outcome Goal not met   Short Term Goals    Short Term Goal # 1 Patient will move supine <> sitting EOB without bed features with supervision within 6tx in order to get in/out of bed   Goal Outcome # 1 goal not met   Short Term Goal # 2 Patient will move sitting <> standing with FWW and supervision within 6tx in order to initiate transfers and gait   Goal Outcome # 2 Goal met   Short Term Goal # 3 Patient will ambulate 50ft with FWW and supervision within 6tx in order to access environment   Goal Outcome # 3 Goal not met   Short Term Goal # 4 Patient will self propel WC 150ft with supervision within 6tx in order to access community   Goal Outcome # 4 Goal not met   Short Term Goal # 5 Patient will ascend/descend 3 steps with supervision within 6tx in order to enter/exit home   Goal Outcome # 5 Goal not met   Physical Therapy Treatment Plan   Physical Therapy Treatment Plan Continue Current Treatment Plan   Anticipated Discharge Equipment and Recommendations   DC Equipment Recommendations Wheelchair   Discharge Recommendations Recommend home health for continued physical therapy services

## 2023-09-03 NOTE — PROGRESS NOTES
Bedside report received.  Assessment complete.  A&O x 4. Patient calls appropriately.  Patient ambulates with SB assist and FWW. TTWB to RLE Bed alarm on.   Patient has 0/10 pain. Pain managed with prescribed medications.  Denies N&V. Tolerating regular diet.  + void, + flatus, - BM.  Patient denies SOB.  SCD's on.    Review plan with of care with patient. Call light and personal belongings within reach. Hourly rounding in place. All needs met at this time.

## 2023-09-03 NOTE — ASSESSMENT & PLAN NOTE
Date of admission: 9/1/2023.  9/2 Transfer orders from SICU.  9/3 Home health referral placed.  9/5 Accepted by home health.

## 2023-09-03 NOTE — FACE TO FACE
Face to Face Note  -  Durable Medical Equipment    Mary Marin D.N.P. - NPI: [id number]  I certify that this patient is under my care and that they have had a durable medical equipment(DME)face to face encounter by myself that meets the physician DME face-to-face encounter requirements with this patient on:    Date of encounter:   Patient:                    MRN:                       YOB: 2024  Annetta Ohara  [id number]  1955     The encounter with the patient was in whole, or in part, for the following medical condition, which is the primary reason for durable medical equipment:  Other - Trauma, pelvic fracture    I certify that, based on my findings, the following durable medical equipment is medically necessary:  Wheelchair   Patient needs manual wheelchair for use inside the home based on the above diagnosis. Per guidelines patient meets criteria in the following ways:   A.  Patient has significant impairment in the following Toileting and is is unable to complete these tasks in a reasonable timeframe.   B.  The patient's mobility limitations cannot be sufficiently resolved by use of  fitted cane or walker.   C.  The patient reports his home provides adequate access between rooms,  maneuvering space, and surfaces for use of the manual wheelchair that is  provided.   D.  The use of the manual wheelchair will significantly improve the patient's  ability to participate in MRADLs and the patient will use it on a regular basis in  the home.   E.  The patient has not expressed an unwillingness to use the manual  wheelchair.   F. The patient has limitations of strength, endurance, range of motion, or coordination per OT notes:.        ------------------------------------------------------------------------------------------------------------------    Face to Face Supporting Documentation - Home Health    The encounter with this patient was in whole or in part the primary reason for home  health admission.    Date of encounter:   Patient:                    MRN:                       YOB: 2024  Annetta Ohara  [id number]  1955     Home health to see patient for:  Skilled Nursing care for assessment, interventions & education and Physical Therapy evaluation and treatment    Skilled need for:  New Onset Medical Diagnosis Trauma, pelvic fracture, continued Physical therapy    Skilled nursing interventions to include:  Comment: management of physical therapy    Homebound evidenced status by:  Need the aid of supportive devices such as crutches, canes, wheelchairs or walkers or Needs the assistance of another person in order to leave the home. Leaving home must require a considerable and taxing effort. There must exist a normal inability to leave the home.    Community Physician to provide follow up care: No primary care provider on file.     Optional Interventions    Wound information & treatment:    Home Infusion Therapy orders:    Line/Drain/Airway:    I certify the face to face encounter for this home care referral meets the CMS requirements and the encounter/clinical assessment with the patient was, in whole, or in part, for the medical condition(s) listed above, which is the primary reason for home health care. Based on my clinical findings: the service(s) are medically necessary, support the need for home health care, and the homebound criteria are met.  I certify that this patient has had a face to face encounter by myself.  BETI Saul.P. - NPI: [id number]    *Debility, frailty and advanced age in the absence of an acute deterioration or exacerbation of a condition do not qualify a patient for home health.

## 2023-09-03 NOTE — CARE PLAN
The patient is Stable - Low risk of patient condition declining or worsening    Shift Goals  Clinical Goals: pain will remain < 5 for this shift, wean O2 as able this shift  Patient Goals: comfort, void  Family Goals: na    Progress made toward(s) clinical / shift goals: patient continues to rate pain as < 5; O2 weaned to 1L; encourage patient to continue to deep breath, cough, and use IS     Patient is not progressing towards the following goals:

## 2023-09-03 NOTE — PROGRESS NOTES
Trauma / Surgical Daily Progress Note    Date of Service  9/3/2023    Chief Complaint  67 y.o. adult admitted 9/1/2023 with closed bilateral fractures of the pubic rami after being bucked off horse    Interval Events  Transferred from ICU to Ortho gregory.  Adequate pain control.  Hgb drop from 13.0 to 10.5 after initiation of Lovenox.  No signs of overt bleeding.  Creatinine 1.71 (1.49)  Supplemental oxygen 1 to 2 L nasal cannula. IS 1275    -1 L crystalloid bolus given.  BMP tomorrow morning.  -Therapy recommended home health.  Order placed.  -Aggressive pulmonary hygiene, with goal to wean oxygen.  -Mobilize.  Advance bowel protocol.    Review of Systems  Review of Systems   Constitutional:  Negative for fever.   Respiratory:  Negative for shortness of breath.    Gastrointestinal:  Positive for abdominal pain (mild tenderness). Negative for nausea and vomiting.   Genitourinary:  Negative for dysuria.   Musculoskeletal:  Positive for joint pain and myalgias.   Neurological:  Negative for dizziness.   All other systems reviewed and are negative.       Vital Signs  Temp:  [36.1 °C (97 °F)-36.8 °C (98.2 °F)] 36.1 °C (97 °F)  Pulse:  [64-86] 79  Resp:  [17-18] 17  BP: (100-130)/(55-72) 130/67  SpO2:  [90 %-94 %] 94 %    Physical Exam  Physical Exam  Vitals and nursing note reviewed.   Constitutional:       Appearance: Normal appearance. He is not ill-appearing.      Interventions: Nasal cannula in place.   HENT:      Mouth/Throat:      Mouth: Mucous membranes are moist.      Pharynx: Oropharynx is clear.   Eyes:      Pupils: Pupils are equal, round, and reactive to light.   Cardiovascular:      Rate and Rhythm: Normal rate.      Pulses: Normal pulses.   Pulmonary:      Effort: Pulmonary effort is normal.      Breath sounds: Normal breath sounds.      Comments: Supplemental oxygen   Abdominal:      General: There is no distension.      Palpations: Abdomen is soft.      Tenderness: There is abdominal tenderness (mild).  There is no guarding or rebound.   Genitourinary:     Comments: Clear yellow urine  Scrotum/penis bruised  Musculoskeletal:         General: Tenderness present. No swelling. Normal range of motion.      Cervical back: Normal range of motion.   Skin:     General: Skin is warm and dry.      Coloration: Skin is not pale.      Findings: Bruising present.   Neurological:      General: No focal deficit present.      Mental Status: He is alert and oriented to person, place, and time.      Sensory: Sensation is intact.   Psychiatric:         Mood and Affect: Mood normal.         Behavior: Behavior normal.         Laboratory  Recent Results (from the past 24 hour(s))   CBC with Differential: Tomorrow AM    Collection Time: 09/03/23  3:26 AM   Result Value Ref Range    WBC 8.1 4.8 - 10.8 K/uL    RBC 3.24 (L) 4.70 - 6.10 M/uL    Hemoglobin 10.5 (L) 14.0 - 18.0 g/dL    Hematocrit 31.0 (L) 42.0 - 52.0 %    MCV 95.7 81.4 - 97.8 fL    MCH 32.4 27.0 - 33.0 pg    MCHC 33.9 32.3 - 36.5 g/dL    RDW 45.6 35.9 - 50.0 fL    Platelet Count 150 (L) 164 - 446 K/uL    MPV 10.5 9.0 - 12.9 fL    Neutrophils-Polys 63.10 44.00 - 72.00 %    Lymphocytes 19.60 (L) 22.00 - 41.00 %    Monocytes 12.00 0.00 - 13.40 %    Eosinophils 4.10 0.00 - 6.90 %    Basophils 0.70 0.00 - 1.80 %    Immature Granulocytes 0.50 0.00 - 0.90 %    Nucleated RBC 0.00 0.00 - 0.20 /100 WBC    Neutrophils (Absolute) 5.08 1.82 - 7.42 K/uL    Lymphs (Absolute) 1.58 1.00 - 4.80 K/uL    Monos (Absolute) 0.97 (H) 0.00 - 0.85 K/uL    Eos (Absolute) 0.33 0.00 - 0.51 K/uL    Baso (Absolute) 0.06 0.00 - 0.12 K/uL    Immature Granulocytes (abs) 0.04 0.00 - 0.11 K/uL    NRBC (Absolute) 0.00 K/uL   Basic Metabolic Panel    Collection Time: 09/03/23  3:26 AM   Result Value Ref Range    Sodium 136 135 - 145 mmol/L    Potassium 3.9 3.6 - 5.5 mmol/L    Chloride 100 96 - 112 mmol/L    Co2 27 20 - 33 mmol/L    Glucose 116 (H) 65 - 99 mg/dL    Bun 37 (H) 8 - 22 mg/dL    Creatinine 1.71 (H) 0.50  - 1.40 mg/dL    Calcium 8.2 (L) 8.5 - 10.5 mg/dL    Anion Gap 9.0 7.0 - 16.0   ESTIMATED GFR    Collection Time: 09/03/23  3:26 AM   Result Value Ref Range    GFR (CKD-EPI) 43 (A) >60 mL/min/1.73 m 2       Fluids    Intake/Output Summary (Last 24 hours) at 9/3/2023 1416  Last data filed at 9/3/2023 1355  Gross per 24 hour   Intake 2579.23 ml   Output 825 ml   Net 1754.23 ml       Core Measures & Quality Metrics  Labs reviewed, Medications reviewed and Radiology images reviewed  Ngo catheter: No Ngo      DVT Prophylaxis: Enoxaparin (Lovenox)  DVT prophylaxis - mechanical: SCDs      Assessed for rehab: Patient was assess for and/or received rehabilitation services during this hospitalization    RAP Score Total: 7    CAGE Results: negative Blood Alcohol>0.08: no       Assessment/Plan  * Trauma- (present on admission)  Assessment & Plan  Bucked from horse.  Trauma Green Activation.  Medhat Lancaster DO. Trauma Surgery.    Discharge planning issues- (present on admission)  Assessment & Plan  Date of admission: 9/1/2023.  9/2 Transfer orders from SICU.  9/3 Home health referral placed.  Cleared for discharge: No.  Discharge delayed: No.  Discharge date: tbd.    Hematoma- (present on admission)  Assessment & Plan  Associated pelvic hematoma with 2 small foci of areas of active bleeding along the superior posterior margin of the right pubic ramus.  Serial laboratory studies and clinical exams.    Closed bilateral fracture of pubic rami (HCC)- (present on admission)  Assessment & Plan  There are fractures of the right pubic ramus and inferior greater ring as well as the right acetabulum.  Non-operative management.  Weight bearing status - Touch toe weightbearing RLE.  If unable to mobilize due to pain after 2 or 3 days I can discuss percutaneous fixation options to further stabilize the pelvis.  If he mobilizes well, repeat x-rays in the office in 7 to 10 days.  Pedro Mason MD. Orthopedic Surgeon. [facility]  [place].    Elevated serum creatinine- (present on admission)  Assessment & Plan  9/2 Creatinine 1.49  - 1 liter bolus. MIVF initiated.  9/3 Creatinine 1.71.  - 1 liter bolus.    No contraindication to deep vein thrombosis (DVT) prophylaxis- (present on admission)  Assessment & Plan  VTE prophylaxis initially contraindicated secondary to elevated bleeding risk.  Interval Initiation of VTE Prophylaxis: 9/2 Prophylactic dose enoxaparin 30 mg BID initiated.     Hypertension- (present on admission)  Assessment & Plan  Chronic condition treated with metoprolol, amlodipine, triamterene-hctz and lisinopril.  Hold Novasc, triamterene-HCTZ and 2nd dose of lisinopril during acute traumatic phase.   Lopressor and one dose of lisinopril initiated on admit.     Hyperglycemia- (present on admission)  Assessment & Plan  No history of DM  A1C 5.1.    Hyperlipidemia- (present on admission)  Assessment & Plan  Chronic condition treated with atorvastatin.  Resumed maintenance medication on admission.        Discussed patient condition with RN, Patient, and trauma surgery, Dr. Lancaster.

## 2023-09-03 NOTE — PROGRESS NOTES
4 Eyes Skin Assessment Completed by SANDY Bradley and SANDY Feliz.    Head WDL  Ears WDL  Nose WDL  Mouth WDL  Neck WDL  Breast/Chest WDL  Shoulder Blades WDL  Spine WDL  (R) Arm/Elbow/Hand Redness and Blanching  (L) Arm/Elbow/Hand Redness and Blanching  Abdomen WDL  Groin WDL  Scrotum/Coccyx/Buttocks Redness and Blanching  (R) Leg WDL  (L) Leg WDL  (R) Heel/Foot/Toe WDL, birth britton on toe.  (L) Heel/Foot/Toe WDL          Devices In Places Pulse Ox and SCD's      Interventions In Place Pillows    Possible Skin Injury No    Pictures Uploaded Into Epic N/A  Wound Consult Placed N/A  RN Wound Prevention Protocol Ordered No

## 2023-09-03 NOTE — CARE PLAN
Problem: Skin Integrity  Goal: Skin integrity is maintained or improved  Outcome: Progressing   Skin integrity interventions: devices in place: silicone O2 tubing with gray foam ear piece and pillows in use for proper positioning.      Problem: Fall Risk  Goal: Patient will remain free from falls  Outcome: Progressing  Fall Prevention in place: Wearing teaded socks, bed locked and in lowest position, call light within reach, DME in bathroom.       The patient is Stable - Low risk of patient condition declining or worsening    Shift Goals  Clinical Goals: pain control, voiding  Patient Goals: comfort, void  Family Goals: na    Progress made toward(s) clinical / shift goals:  progressing     Patient is not progressing towards the following goals:

## 2023-09-03 NOTE — ASSESSMENT & PLAN NOTE
9/2 Creatinine 1.49  - 1 liter bolus. MIVF initiated.  9/3 Creatinine 1.71.  - 1 liter bolus.  9/4 Creatinine normalized.

## 2023-09-04 ENCOUNTER — APPOINTMENT (OUTPATIENT)
Dept: RADIOLOGY | Facility: MEDICAL CENTER | Age: 68
DRG: 535 | End: 2023-09-04
Payer: MEDICARE

## 2023-09-04 PROBLEM — R79.89 ELEVATED SERUM CREATININE: Status: RESOLVED | Noted: 2023-09-03 | Resolved: 2023-09-04

## 2023-09-04 LAB
ANION GAP SERPL CALC-SCNC: 8 MMOL/L (ref 7–16)
BASOPHILS # BLD AUTO: 0.8 % (ref 0–1.8)
BASOPHILS # BLD: 0.06 K/UL (ref 0–0.12)
BUN SERPL-MCNC: 12 MG/DL (ref 8–22)
CALCIUM SERPL-MCNC: 8.4 MG/DL (ref 8.5–10.5)
CHLORIDE SERPL-SCNC: 102 MMOL/L (ref 96–112)
CO2 SERPL-SCNC: 29 MMOL/L (ref 20–33)
CREAT SERPL-MCNC: 0.72 MG/DL (ref 0.5–1.4)
EOSINOPHIL # BLD AUTO: 0.49 K/UL (ref 0–0.51)
EOSINOPHIL NFR BLD: 6.9 % (ref 0–6.9)
ERYTHROCYTE [DISTWIDTH] IN BLOOD BY AUTOMATED COUNT: 45.7 FL (ref 35.9–50)
GFR SERPLBLD CREATININE-BSD FMLA CKD-EPI: 99 ML/MIN/1.73 M 2
GLUCOSE SERPL-MCNC: 106 MG/DL (ref 65–99)
HCT VFR BLD AUTO: 29.9 % (ref 42–52)
HGB BLD-MCNC: 10.3 G/DL (ref 14–18)
IMM GRANULOCYTES # BLD AUTO: 0.05 K/UL (ref 0–0.11)
IMM GRANULOCYTES NFR BLD AUTO: 0.7 % (ref 0–0.9)
LYMPHOCYTES # BLD AUTO: 0.86 K/UL (ref 1–4.8)
LYMPHOCYTES NFR BLD: 12.1 % (ref 22–41)
MCH RBC QN AUTO: 33.3 PG (ref 27–33)
MCHC RBC AUTO-ENTMCNC: 34.4 G/DL (ref 32.3–36.5)
MCV RBC AUTO: 96.8 FL (ref 81.4–97.8)
MONOCYTES # BLD AUTO: 0.92 K/UL (ref 0–0.85)
MONOCYTES NFR BLD AUTO: 13 % (ref 0–13.4)
NEUTROPHILS # BLD AUTO: 4.71 K/UL (ref 1.82–7.42)
NEUTROPHILS NFR BLD: 66.5 % (ref 44–72)
NRBC # BLD AUTO: 0 K/UL
NRBC BLD-RTO: 0 /100 WBC (ref 0–0.2)
PLATELET # BLD AUTO: 161 K/UL (ref 164–446)
PMV BLD AUTO: 10.6 FL (ref 9–12.9)
POTASSIUM SERPL-SCNC: 3.8 MMOL/L (ref 3.6–5.5)
RBC # BLD AUTO: 3.09 M/UL (ref 4.7–6.1)
SODIUM SERPL-SCNC: 139 MMOL/L (ref 135–145)
WBC # BLD AUTO: 7.1 K/UL (ref 4.8–10.8)

## 2023-09-04 PROCEDURE — A9270 NON-COVERED ITEM OR SERVICE: HCPCS | Performed by: SURGERY

## 2023-09-04 PROCEDURE — 80048 BASIC METABOLIC PNL TOTAL CA: CPT

## 2023-09-04 PROCEDURE — 85025 COMPLETE CBC W/AUTO DIFF WBC: CPT

## 2023-09-04 PROCEDURE — 700102 HCHG RX REV CODE 250 W/ 637 OVERRIDE(OP): Performed by: NURSE PRACTITIONER

## 2023-09-04 PROCEDURE — 700105 HCHG RX REV CODE 258: Performed by: NURSE PRACTITIONER

## 2023-09-04 PROCEDURE — 700102 HCHG RX REV CODE 250 W/ 637 OVERRIDE(OP)

## 2023-09-04 PROCEDURE — 700102 HCHG RX REV CODE 250 W/ 637 OVERRIDE(OP): Performed by: SURGERY

## 2023-09-04 PROCEDURE — A9270 NON-COVERED ITEM OR SERVICE: HCPCS

## 2023-09-04 PROCEDURE — 700111 HCHG RX REV CODE 636 W/ 250 OVERRIDE (IP): Performed by: NURSE PRACTITIONER

## 2023-09-04 PROCEDURE — 99232 SBSQ HOSP IP/OBS MODERATE 35: CPT

## 2023-09-04 PROCEDURE — 71045 X-RAY EXAM CHEST 1 VIEW: CPT

## 2023-09-04 PROCEDURE — A9270 NON-COVERED ITEM OR SERVICE: HCPCS | Performed by: NURSE PRACTITIONER

## 2023-09-04 PROCEDURE — 770001 HCHG ROOM/CARE - MED/SURG/GYN PRIV*

## 2023-09-04 RX ORDER — CHOLECALCIFEROL (VITAMIN D3) 125 MCG
5 CAPSULE ORAL NIGHTLY PRN
Status: DISCONTINUED | OUTPATIENT
Start: 2023-09-04 | End: 2023-09-05 | Stop reason: HOSPADM

## 2023-09-04 RX ORDER — DIPHENHYDRAMINE HCL 25 MG
25 TABLET ORAL NIGHTLY PRN
Status: DISCONTINUED | OUTPATIENT
Start: 2023-09-04 | End: 2023-09-05 | Stop reason: HOSPADM

## 2023-09-04 RX ADMIN — LISINOPRIL 20 MG: 20 TABLET ORAL at 05:08

## 2023-09-04 RX ADMIN — METAXALONE 800 MG: 800 TABLET ORAL at 17:29

## 2023-09-04 RX ADMIN — THERA TABS 1 TABLET: TAB at 05:04

## 2023-09-04 RX ADMIN — ATORVASTATIN CALCIUM 40 MG: 40 TABLET, FILM COATED ORAL at 05:05

## 2023-09-04 RX ADMIN — METOPROLOL TARTRATE 50 MG: 50 TABLET, FILM COATED ORAL at 05:07

## 2023-09-04 RX ADMIN — ACETAMINOPHEN 650 MG: 325 TABLET, FILM COATED ORAL at 11:42

## 2023-09-04 RX ADMIN — SODIUM CHLORIDE, POTASSIUM CHLORIDE, SODIUM LACTATE AND CALCIUM CHLORIDE: 600; 310; 30; 20 INJECTION, SOLUTION INTRAVENOUS at 03:24

## 2023-09-04 RX ADMIN — ACETAMINOPHEN 650 MG: 325 TABLET, FILM COATED ORAL at 05:04

## 2023-09-04 RX ADMIN — METAXALONE 800 MG: 800 TABLET ORAL at 06:08

## 2023-09-04 RX ADMIN — CELECOXIB 100 MG: 100 CAPSULE ORAL at 05:05

## 2023-09-04 RX ADMIN — ENOXAPARIN SODIUM 30 MG: 100 INJECTION SUBCUTANEOUS at 23:05

## 2023-09-04 RX ADMIN — GABAPENTIN 100 MG: 100 CAPSULE ORAL at 23:05

## 2023-09-04 RX ADMIN — GABAPENTIN 100 MG: 100 CAPSULE ORAL at 05:05

## 2023-09-04 RX ADMIN — ACETAMINOPHEN 650 MG: 325 TABLET, FILM COATED ORAL at 17:29

## 2023-09-04 RX ADMIN — ACETAMINOPHEN 650 MG: 325 TABLET, FILM COATED ORAL at 23:04

## 2023-09-04 RX ADMIN — DOCUSATE SODIUM 100 MG: 100 CAPSULE, LIQUID FILLED ORAL at 17:29

## 2023-09-04 RX ADMIN — OXYCODONE HYDROCHLORIDE 5 MG: 5 TABLET ORAL at 21:22

## 2023-09-04 RX ADMIN — ENOXAPARIN SODIUM 30 MG: 100 INJECTION SUBCUTANEOUS at 11:42

## 2023-09-04 RX ADMIN — METAXALONE 800 MG: 800 TABLET ORAL at 11:42

## 2023-09-04 RX ADMIN — Medication 5 MG: at 23:04

## 2023-09-04 RX ADMIN — GABAPENTIN 100 MG: 100 CAPSULE ORAL at 13:21

## 2023-09-04 RX ADMIN — SENNOSIDES AND DOCUSATE SODIUM 1 TABLET: 50; 8.6 TABLET ORAL at 23:04

## 2023-09-04 RX ADMIN — DOCUSATE SODIUM 100 MG: 100 CAPSULE, LIQUID FILLED ORAL at 05:05

## 2023-09-04 ASSESSMENT — PAIN DESCRIPTION - PAIN TYPE: TYPE: ACUTE PAIN

## 2023-09-04 ASSESSMENT — ENCOUNTER SYMPTOMS
GASTROINTESTINAL NEGATIVE: 1
CARDIOVASCULAR NEGATIVE: 1
EYES NEGATIVE: 1
SHORTNESS OF BREATH: 0
NEUROLOGICAL NEGATIVE: 1
MYALGIAS: 1
COUGH: 0
CONSTITUTIONAL NEGATIVE: 1

## 2023-09-04 NOTE — PROGRESS NOTES
Assumed care of patient at bedside report from NOC RN. Updated on POC. Patient currently A & O x 4; on 2 L O2 nasal cannula; up x1-2 assist with FWW, pending home wheelchair; without complaints of acute pain while resting in bed. Assessment completed. Call light within reach. Whiteboard updated. Fall precautions in place. Bed locked and in lowest position. All questions answered. No other needs indicated at this time.

## 2023-09-04 NOTE — DISCHARGE PLANNING
Received choice form at: 1403  Agency[facility]Facility name: [facility]  Referral sent per choice form at:  1407      [facility] is the only [facility] that can be used with [employer]    Received choice form at: 1105  Agency/Facility name: [facility], Preferred, [facility]  Referral sent per choice form at:  1131    Received choice form at: 1109  Agency/Facility name: [facility]  Referral sent per choice form at:  1150

## 2023-09-04 NOTE — CARE PLAN
Problem: Pain - Standard  Goal: Alleviation of pain or a reduction in pain to the patient’s comfort goal  Outcome: Progressing     Problem: Skin Integrity  Goal: Skin integrity is maintained or improved  Outcome: Progressing     Problem: Fall Risk  Goal: Patient will remain free from falls  Outcome: Progressing   The patient is Stable - Low risk of patient condition declining or worsening    Shift Goals  Clinical Goals: BM, pain control, mobility  Patient Goals: comfort  Family Goals: N/A    Progress made toward(s) clinical / shift goals:  will continue to assess pain and manage appropriately

## 2023-09-04 NOTE — DISCHARGE PLANNING
Care Transition Team Assessment    In the case of an emergency, pt's legal NOK is spouse, Vivi     RNCM met with pt at bedside and obtained the information used in this assessment. Pt verified accuracy of facesheet. Pt lives in a single story home with spouse in [place].   Pt uses [facility] pharmacy. Prior to current hospitalization, pt was completely independent in ADLS/IADLS. Pt drives and is able to attend necessary MD appointments.  Pt has a good support system. Pt denies any hx of substance use and denies any dx of mh. Pt owns no DME. States friends have built W/C ramp to enter home.    Information Source:pt  Orientation Level: Oriented X4  Information Given By: Patient  Informant's Name: William  Who is responsible for making decisions for patient? : Patient         Elopement Risk  Legal Hold: No  Ambulatory or Self Mobile in Wheelchair: Yes  Disoriented: No  Psychiatric Symptoms: None  History of Wandering: No  Elopement this Admit: No  Vocalizing Wanting to Leave: No  Displays Behaviors, Body Language Wanting to Leave: No-Not at Risk for Elopement  Elopement Risk: Not at Risk for Elopement    Interdisciplinary Discharge Planning  Does Admitting Nurse Feel This Could be a Complex Discharge?: No  Primary Care Physician: Urbano Rider  Lives with - Patient's Self Care Capacity: Spouse  Patient or legal guardian wants to designate a caregiver: No  Support Systems: Family Member(s)  Housing / Facility: 1 Story House  Do You Take your Prescribed Medications Regularly: Yes  Mobility Issues: No  Prior Services: None  Durable Medical Equipment: Not Applicable    Discharge Preparedness  What is your plan after discharge?: Home with help, Home health care  What are your discharge supports?: Spouse  Prior Functional Level: Ambulatory, Drives Self, Independent with Activities of Daily Living, Independent with Medication Management  Difficulity with ADLs: None  Difficulity with IADLs: None    Functional  Assesment  Prior Functional Level: Ambulatory, Drives Self, Independent with Activities of Daily Living, Independent with Medication Management    Finances  Prescription Coverage: Yes    Vision / Hearing Impairment  Right Eye Vision: Wears Glasses  Left Eye Vision: Wears Glasses              Domestic Abuse  Have you ever been the victim of abuse or violence?: No  Physical Abuse or Sexual Abuse: No  Verbal Abuse or Emotional Abuse: No  Possible Abuse/Neglect Reported to:: Not Applicable    Psychological Assessment  History of Substance Abuse: None  History of Psychiatric Problems: No         Anticipated Discharge Information  Discharge Disposition: D/T to home under [facility] care in anticipation of covered skilled care (06)

## 2023-09-04 NOTE — CARE PLAN
The patient is Stable - Low risk of patient condition declining or worsening    Shift Goals  Clinical Goals: pain control  Patient Goals: pain management; safe mobilization  Family Goals: N/A    Progress made toward(s) clinical / shift goals:    Problem: Pain - Standard  Goal: Alleviation of pain or a reduction in pain to the patient’s comfort goal  Outcome: Progressing     Problem: Knowledge Deficit - Standard  Goal: Patient and family/care givers will demonstrate understanding of plan of care, disease process/condition, diagnostic tests and medications  Outcome: Progressing     Problem: Skin Integrity  Goal: Skin integrity is maintained or improved  Outcome: Progressing     Discussed daily POC. Discussed MD rounding. Encouraged safe mobilization. Skin breakdown prevention measures in place. Discussed pain management options    Patient is not progressing towards the following goals:

## 2023-09-04 NOTE — PROGRESS NOTES
Trauma / Surgical Daily Progress Note    Date of Service  9/4/2023    Chief Complaint  67 y.o. adult admitted 9/1/2023 with closed bilateral fractures of the pubic rami after being bucked off horse    Interval Events  Creatinine normalized after increased IV hydration yesterday.  Continues to require supplemental oxygen with incentive spirometer use at 2000.    - Saline lock, repeat BMP in AM  - Chest xray ordered & pending  - Continue aggressive pulmonary hygiene with hourly incentive spirometry  - Disposition: PT & OT recommend home health. Home health ordered yesterday. Anticipate medical clearance tomorrow for discharge pending oxygen requirements and home health acceptance.     Review of Systems  Review of Systems   Constitutional: Negative.    HENT: Negative.     Eyes: Negative.    Respiratory:  Negative for cough and shortness of breath.    Cardiovascular: Negative.    Gastrointestinal: Negative.    Genitourinary: Negative.         Denies retention   Musculoskeletal:  Positive for joint pain (right hip) and myalgias (pelvis).   Neurological: Negative.         Vital Signs  Temp:  [36.1 °C (97 °F)-36.9 °C (98.4 °F)] 36.4 °C (97.5 °F)  Pulse:  [66-94] 66  Resp:  [16-17] 17  BP: (127-149)/(74-79) 149/77  SpO2:  [91 %-95 %] 95 %    Physical Exam  Physical Exam  Vitals reviewed.   Constitutional:       General: He is not in acute distress.     Appearance: He is not ill-appearing.      Interventions: Nasal cannula in place.   Cardiovascular:      Rate and Rhythm: Normal rate and regular rhythm.      Heart sounds: Murmur (baseline) heard.   Pulmonary:      Effort: Pulmonary effort is normal. No respiratory distress.      Breath sounds: Examination of the right-upper field reveals decreased breath sounds. Examination of the left-upper field reveals decreased breath sounds. Decreased breath sounds present.   Abdominal:      General: There is no distension.      Palpations: Abdomen is soft.      Tenderness: There is  no abdominal tenderness. There is no guarding.   Genitourinary:     Comments: Clear yellow urine  Scrotum/penis bruised  Musculoskeletal:         General: Tenderness (right hip & pelvis) present.   Skin:     General: Skin is warm and dry.   Neurological:      General: No focal deficit present.      Mental Status: He is alert and oriented to person, place, and time. Mental status is at baseline.      GCS: GCS eye subscore is 4. GCS verbal subscore is 5. GCS motor subscore is 6.      Sensory: Sensation is intact.   Psychiatric:         Mood and Affect: Mood normal.         Behavior: Behavior normal.         Laboratory  Recent Results (from the past 24 hour(s))   CBC with Differential: Tomorrow AM    Collection Time: 09/04/23  6:29 AM   Result Value Ref Range    WBC 7.1 4.8 - 10.8 K/uL    RBC 3.09 (L) 4.70 - 6.10 M/uL    Hemoglobin 10.3 (L) 14.0 - 18.0 g/dL    Hematocrit 29.9 (L) 42.0 - 52.0 %    MCV 96.8 81.4 - 97.8 fL    MCH 33.3 (H) 27.0 - 33.0 pg    MCHC 34.4 32.3 - 36.5 g/dL    RDW 45.7 35.9 - 50.0 fL    Platelet Count 161 (L) 164 - 446 K/uL    MPV 10.6 9.0 - 12.9 fL    Neutrophils-Polys 66.50 44.00 - 72.00 %    Lymphocytes 12.10 (L) 22.00 - 41.00 %    Monocytes 13.00 0.00 - 13.40 %    Eosinophils 6.90 0.00 - 6.90 %    Basophils 0.80 0.00 - 1.80 %    Immature Granulocytes 0.70 0.00 - 0.90 %    Nucleated RBC 0.00 0.00 - 0.20 /100 WBC    Neutrophils (Absolute) 4.71 1.82 - 7.42 K/uL    Lymphs (Absolute) 0.86 (L) 1.00 - 4.80 K/uL    Monos (Absolute) 0.92 (H) 0.00 - 0.85 K/uL    Eos (Absolute) 0.49 0.00 - 0.51 K/uL    Baso (Absolute) 0.06 0.00 - 0.12 K/uL    Immature Granulocytes (abs) 0.05 0.00 - 0.11 K/uL    NRBC (Absolute) 0.00 K/uL   Basic Metabolic Panel    Collection Time: 09/04/23  6:29 AM   Result Value Ref Range    Sodium 139 135 - 145 mmol/L    Potassium 3.8 3.6 - 5.5 mmol/L    Chloride 102 96 - 112 mmol/L    Co2 29 20 - 33 mmol/L    Glucose 106 (H) 65 - 99 mg/dL    Bun 12 8 - 22 mg/dL    Creatinine 0.72 0.50  - 1.40 mg/dL    Calcium 8.4 (L) 8.5 - 10.5 mg/dL    Anion Gap 8.0 7.0 - 16.0   ESTIMATED GFR    Collection Time: 09/04/23  6:29 AM   Result Value Ref Range    GFR (CKD-EPI) 99 >60 mL/min/1.73 m 2       Fluids    Intake/Output Summary (Last 24 hours) at 9/4/2023 1329  Last data filed at 9/4/2023 1251  Gross per 24 hour   Intake 1275.55 ml   Output 2525 ml   Net -1249.45 ml       Core Measures & Quality Metrics  Labs reviewed, Medications reviewed and Radiology images reviewed  Ngo catheter: No Ngo      DVT Prophylaxis: Enoxaparin (Lovenox)  DVT prophylaxis - mechanical: SCDs  Ulcer prophylaxis: Not indicated    Assessed for rehab: Patient was assess for and/or received rehabilitation services during this hospitalization    RAP Score Total: 7    CAGE Results: negative Blood Alcohol>0.08: no     Assessment/Plan  * Trauma- (present on admission)  Assessment & Plan  Bucked from horse.  Trauma Green Activation.  Medhat Lancaster DO. Trauma Surgery.    Discharge planning issues- (present on admission)  Assessment & Plan  Date of admission: 9/1/2023.  9/2 Transfer orders from SICU.  9/3 Home health referral placed.  Cleared for discharge: No.    Hematoma- (present on admission)  Assessment & Plan  Associated pelvic hematoma with 2 small foci of areas of active bleeding along the superior posterior margin of the right pubic ramus.  Serial hemograms stable.    Closed bilateral fracture of pubic rami (HCC)- (present on admission)  Assessment & Plan  Fractures of the right pubic ramus and inferior greater ring as well as the right acetabulum.  Non-operative management.  Weight bearing status - Touch toe weightbearing RLE.  Pedro Mason MD. Orthopedic Surgeon. [facility].    No contraindication to deep vein thrombosis (DVT) prophylaxis- (present on admission)  Assessment & Plan  VTE prophylaxis initially contraindicated secondary to elevated bleeding risk.  Interval Initiation of VTE Prophylaxis: 9/2  Prophylactic dose enoxaparin 30 mg BID initiated.     Hypertension- (present on admission)  Assessment & Plan  Chronic condition treated with metoprolol, amlodipine, triamterene-hctz and lisinopril.    Hyperglycemia- (present on admission)  Assessment & Plan  No history of diabetes.  Hemoglobin A1C 5.1    Hyperlipidemia- (present on admission)  Assessment & Plan  Chronic condition treated with atorvastatin.  Resumed maintenance medication on admission.      Discussed patient condition with Family, RN, , Charge nurse / hot rounds, Patient, and trauma surgery, Dr. Lancaster.

## 2023-09-05 ENCOUNTER — PHARMACY VISIT (OUTPATIENT)
Dept: PHARMACY | Facility: MEDICAL CENTER | Age: 68
End: 2023-09-05
Payer: COMMERCIAL

## 2023-09-05 VITALS
TEMPERATURE: 97.2 F | HEART RATE: 85 BPM | RESPIRATION RATE: 17 BRPM | HEIGHT: 67 IN | BODY MASS INDEX: 29.79 KG/M2 | DIASTOLIC BLOOD PRESSURE: 82 MMHG | SYSTOLIC BLOOD PRESSURE: 151 MMHG | WEIGHT: 189.82 LBS | OXYGEN SATURATION: 96 %

## 2023-09-05 LAB
ANION GAP SERPL CALC-SCNC: 9 MMOL/L (ref 7–16)
BASOPHILS # BLD AUTO: 0.7 % (ref 0–1.8)
BASOPHILS # BLD: 0.06 K/UL (ref 0–0.12)
BUN SERPL-MCNC: 10 MG/DL (ref 8–22)
CALCIUM SERPL-MCNC: 8.5 MG/DL (ref 8.5–10.5)
CHLORIDE SERPL-SCNC: 102 MMOL/L (ref 96–112)
CO2 SERPL-SCNC: 28 MMOL/L (ref 20–33)
CREAT SERPL-MCNC: 0.7 MG/DL (ref 0.5–1.4)
EOSINOPHIL # BLD AUTO: 0.41 K/UL (ref 0–0.51)
EOSINOPHIL NFR BLD: 5 % (ref 0–6.9)
ERYTHROCYTE [DISTWIDTH] IN BLOOD BY AUTOMATED COUNT: 44 FL (ref 35.9–50)
GFR SERPLBLD CREATININE-BSD FMLA CKD-EPI: 100 ML/MIN/1.73 M 2
GLUCOSE SERPL-MCNC: 110 MG/DL (ref 65–99)
HCT VFR BLD AUTO: 30.8 % (ref 42–52)
HGB BLD-MCNC: 10.6 G/DL (ref 14–18)
IMM GRANULOCYTES # BLD AUTO: 0.08 K/UL (ref 0–0.11)
IMM GRANULOCYTES NFR BLD AUTO: 1 % (ref 0–0.9)
LYMPHOCYTES # BLD AUTO: 1.52 K/UL (ref 1–4.8)
LYMPHOCYTES NFR BLD: 18.6 % (ref 22–41)
MCH RBC QN AUTO: 33.1 PG (ref 27–33)
MCHC RBC AUTO-ENTMCNC: 34.4 G/DL (ref 32.3–36.5)
MCV RBC AUTO: 96.3 FL (ref 81.4–97.8)
MONOCYTES # BLD AUTO: 0.99 K/UL (ref 0–0.85)
MONOCYTES NFR BLD AUTO: 12.1 % (ref 0–13.4)
NEUTROPHILS # BLD AUTO: 5.11 K/UL (ref 1.82–7.42)
NEUTROPHILS NFR BLD: 62.6 % (ref 44–72)
NRBC # BLD AUTO: 0 K/UL
NRBC BLD-RTO: 0 /100 WBC (ref 0–0.2)
PLATELET # BLD AUTO: 172 K/UL (ref 164–446)
PMV BLD AUTO: 10.3 FL (ref 9–12.9)
POTASSIUM SERPL-SCNC: 3.6 MMOL/L (ref 3.6–5.5)
RBC # BLD AUTO: 3.2 M/UL (ref 4.7–6.1)
SODIUM SERPL-SCNC: 139 MMOL/L (ref 135–145)
WBC # BLD AUTO: 8.2 K/UL (ref 4.8–10.8)

## 2023-09-05 PROCEDURE — 700102 HCHG RX REV CODE 250 W/ 637 OVERRIDE(OP): Performed by: SURGERY

## 2023-09-05 PROCEDURE — 97116 GAIT TRAINING THERAPY: CPT

## 2023-09-05 PROCEDURE — 97535 SELF CARE MNGMENT TRAINING: CPT

## 2023-09-05 PROCEDURE — 85025 COMPLETE CBC W/AUTO DIFF WBC: CPT

## 2023-09-05 PROCEDURE — 80048 BASIC METABOLIC PNL TOTAL CA: CPT

## 2023-09-05 PROCEDURE — 700111 HCHG RX REV CODE 636 W/ 250 OVERRIDE (IP): Performed by: NURSE PRACTITIONER

## 2023-09-05 PROCEDURE — 700102 HCHG RX REV CODE 250 W/ 637 OVERRIDE(OP)

## 2023-09-05 PROCEDURE — 99239 HOSP IP/OBS DSCHRG MGMT >30: CPT

## 2023-09-05 PROCEDURE — A9270 NON-COVERED ITEM OR SERVICE: HCPCS | Performed by: SURGERY

## 2023-09-05 PROCEDURE — 97530 THERAPEUTIC ACTIVITIES: CPT

## 2023-09-05 PROCEDURE — RXMED WILLOW AMBULATORY MEDICATION CHARGE

## 2023-09-05 PROCEDURE — A9270 NON-COVERED ITEM OR SERVICE: HCPCS

## 2023-09-05 RX ORDER — METHOCARBAMOL 500 MG/1
500 TABLET, FILM COATED ORAL 3 TIMES DAILY
Qty: 21 TABLET | Refills: 0 | Status: SHIPPED | OUTPATIENT
Start: 2023-09-05 | End: 2023-09-12

## 2023-09-05 RX ORDER — GABAPENTIN 100 MG/1
100 CAPSULE ORAL EVERY 8 HOURS
Qty: 21 CAPSULE | Refills: 0 | Status: SHIPPED | OUTPATIENT
Start: 2023-09-05 | End: 2023-09-12

## 2023-09-05 RX ORDER — PSEUDOEPHEDRINE HCL 30 MG
100 TABLET ORAL 2 TIMES DAILY
Qty: 60 CAPSULE | COMMUNITY
Start: 2023-09-05

## 2023-09-05 RX ORDER — ACETAMINOPHEN 325 MG/1
650 TABLET ORAL EVERY 4 HOURS PRN
COMMUNITY
Start: 2023-09-05

## 2023-09-05 RX ORDER — POLYETHYLENE GLYCOL 3350 17 G/17G
17 POWDER, FOR SOLUTION ORAL DAILY
COMMUNITY
Start: 2023-09-05

## 2023-09-05 RX ORDER — OXYCODONE HYDROCHLORIDE 5 MG/1
5 TABLET ORAL EVERY 4 HOURS PRN
Qty: 25 TABLET | Refills: 0 | Status: SHIPPED | OUTPATIENT
Start: 2023-09-05 | End: 2023-09-11 | Stop reason: SDUPTHER

## 2023-09-05 RX ADMIN — DIPHENHYDRAMINE HYDROCHLORIDE 25 MG: 25 TABLET ORAL at 00:34

## 2023-09-05 RX ADMIN — GABAPENTIN 100 MG: 100 CAPSULE ORAL at 04:51

## 2023-09-05 RX ADMIN — OXYCODONE HYDROCHLORIDE 5 MG: 5 TABLET ORAL at 11:22

## 2023-09-05 RX ADMIN — METOPROLOL TARTRATE 50 MG: 50 TABLET, FILM COATED ORAL at 04:50

## 2023-09-05 RX ADMIN — ATORVASTATIN CALCIUM 40 MG: 40 TABLET, FILM COATED ORAL at 04:52

## 2023-09-05 RX ADMIN — ACETAMINOPHEN 650 MG: 325 TABLET, FILM COATED ORAL at 11:23

## 2023-09-05 RX ADMIN — DOCUSATE SODIUM 100 MG: 100 CAPSULE, LIQUID FILLED ORAL at 04:49

## 2023-09-05 RX ADMIN — ENOXAPARIN SODIUM 30 MG: 100 INJECTION SUBCUTANEOUS at 11:23

## 2023-09-05 RX ADMIN — THERA TABS 1 TABLET: TAB at 04:52

## 2023-09-05 RX ADMIN — METAXALONE 800 MG: 800 TABLET ORAL at 04:50

## 2023-09-05 RX ADMIN — ACETAMINOPHEN 650 MG: 325 TABLET, FILM COATED ORAL at 04:51

## 2023-09-05 RX ADMIN — METAXALONE 800 MG: 800 TABLET ORAL at 11:22

## 2023-09-05 ASSESSMENT — COGNITIVE AND FUNCTIONAL STATUS - GENERAL
TOILETING: A LITTLE
DRESSING REGULAR LOWER BODY CLOTHING: A LITTLE
SUGGESTED CMS G CODE MODIFIER DAILY ACTIVITY: CJ
DAILY ACTIVITIY SCORE: 21
MOVING FROM LYING ON BACK TO SITTING ON SIDE OF FLAT BED: A LITTLE
HELP NEEDED FOR BATHING: A LITTLE
WALKING IN HOSPITAL ROOM: A LITTLE
TURNING FROM BACK TO SIDE WHILE IN FLAT BAD: A LITTLE
STANDING UP FROM CHAIR USING ARMS: A LITTLE
MOVING TO AND FROM BED TO CHAIR: A LOT

## 2023-09-05 ASSESSMENT — PAIN DESCRIPTION - PAIN TYPE
TYPE: ACUTE PAIN
TYPE: ACUTE PAIN

## 2023-09-05 ASSESSMENT — GAIT ASSESSMENTS
ASSISTIVE DEVICE: FRONT WHEEL WALKER
DISTANCE (FEET): 20
GAIT LEVEL OF ASSIST: SUPERVISED

## 2023-09-05 NOTE — PROGRESS NOTES
DC instructions reviewed w/ pt , verbalized understanding. PIV dc'd, armband removed. Meds to bed delivered. Home w/ fww.

## 2023-09-05 NOTE — DISCHARGE PLANNING
[facility] has accepted pt and they will assign to Dr. Stiles.  His is a mobile physician and goes to [place], contact is .

## 2023-09-05 NOTE — THERAPY
"Occupational Therapy  Discharge      Patient Name: Annetta Ohara  Age:  67 y.o., Sex:  male  Medical Record #: [id number]  Today's Date: 9/5/2023     Precautions  Precautions: Fall Risk, Toe Touch Weight Bearing Right Lower Extremity  Comments: ROMAT RLE    Assessment    Pt edu on compensatory strategies during ADLs as well as appropriate AE/DME to maximize independence and safety. He is at a supervision level for basic self care, functional mobility, and functional transfers. He denies any further deficits in self care at this time and will have great support upon d/c.    Plan    Reason for Discharge From Therapy: Discharge Secondary to Goals Met    DC Equipment Recommendations: Tub Transfer Bench, Raised Toilet Seat with Arms, Sock Aide, Reacher  Discharge Recommendations: Recommend home health for continued occupational therapy services (not a barrier to d/c if unable to obtain)    Subjective    \"I'm just in a lot of pain right now.\"     Objective       09/05/23 1048   Cognition    Cognition / Consciousness WDL   Level of Consciousness Alert   Comments pleasant and cooperative   Other Treatments   Other Treatments Provided reviewed DME, bathroom set up, where to obtain equipment   Balance   Sitting Balance (Static) Good   Sitting Balance (Dynamic) Fair +   Standing Balance (Static) Fair +   Standing Balance (Dynamic) Fair   Weight Shift Sitting Good   Weight Shift Standing Fair   Skilled Intervention Verbal Cuing;Tactile Cuing;Sequencing   Comments w/ fww, intermittent cues to maintain TTWB   Bed Mobility    Supine to Sit Standby Assist   Sit to Supine Standby Assist   Scooting Supervised   Rolling Supervised   Skilled Intervention Verbal Cuing;Sequencing   Comments HOB flat, no rail   Activities of Daily Living   Grooming Supervision;Standing  (oral care)   Bathing Supervision  (light spongebath; seated/standing)   Upper Body Dressing Supervision  (don shirt)   Lower Body Dressing Supervision  (don pants w/ use of " reacher, don/doff socks w/ use of sock aid and reacher)   Toileting Supervision  (simulated)   Skilled Intervention Verbal Cuing;Sequencing;Compensatory Strategies   Comments edu on ADLs and compensatory strategies   How much help from another person does the patient currently need...   Putting on and taking off regular lower body clothing? 3   Bathing (including washing, rinsing, and drying)? 3   Toileting, which includes using a toilet, bedpan, or urinal? 3   Putting on and taking off regular upper body clothing? 4   Taking care of personal grooming such as brushing teeth? 4   Eating meals? 4   6 Clicks Daily Activity Score 21   Functional Mobility   Sit to Stand Supervised   Bed, Chair, Wheelchair Transfer Supervised   Toilet Transfers Supervised   Mobility EOB>BR>BTB   Skilled Intervention Verbal Cuing   Comments w/ fww   Patient / Family Goals   Patient / Family Goal #1 to go home   Short Term Goals   Short Term Goal # 1 pt will complete FB dressing w/spv   Goal Outcome # 1 Goal met   Short Term Goal # 2 pt will complete toilet txf and ramona care w/spv   Goal Outcome # 2 Goal met

## 2023-09-05 NOTE — DISCHARGE SUMMARY
Trauma Discharge Summary    DATE OF ADMISSION: 9/1/2023    DATE OF DISCHARGE: 9/5/2023    LENGTH OF STAY: 4 days    ATTENDING PHYSICIAN: Medhat Lancaster D.O.    CONSULTING PHYSICIAN:   1. Dr. Pedro Mason MD, Orthopedic Surgery    DISCHARGE DIAGNOSIS:  Principal Problem:    Trauma  Active Problems:    Closed bilateral fracture of pubic rami (HCC)    Hematoma    Discharge planning issues    Hypertension    No contraindication to deep vein thrombosis (DVT) prophylaxis    Hyperlipidemia    Hyperglycemia  Resolved Problems:    Elevated serum creatinine      PROCEDURES: None    HISTORY OF PRESENT ILLNESS: The patient is a 67 y.o. male who was reportedly injured in after getting bucked off a horse. He was transferred to [facility] in [place], [place].    HOSPITAL COURSE: The patient was triaged as a consult activation. He was found to have closed pelvic fractures and a pelvic hematoma. The patient was transported to trauma intensive care unit where he was monitored with serial hemograms. Orthopedics was consulted and recommended non operative management and toe touch weight bearing precautions. His hemograms remained stabled and he was transferred to the ortho trauma gregory where he was evaluated by therapies. Physical therapy and occupational therapy recommended the patient was safe to discharge home with a front wheel walker.     On day of discharge, the patient was tolerating room air and a regular diet. He had adequate pain control and was mobilizing with a front wheel walker. He was accepted by home health. He was discharged home with outpatient follow up as discussed below.    HOSPITAL PROBLEM LIST:  * Trauma- (present on admission)  Assessment & Plan  Bucked from horse.  Trauma Green Activation.  Medhat Lancaster DO. Trauma Surgery.    Discharge planning issues- (present on admission)  Assessment & Plan  Date of admission: 9/1/2023.  9/2 Transfer orders from SICU.  9/3 Home health referral  placed.  9/5 Accepted by [facility].    Hematoma- (present on admission)  Assessment & Plan  Associated pelvic hematoma with 2 small foci of areas of active bleeding along the superior posterior margin of the right pubic ramus.  Serial hemograms stable.    Closed bilateral fracture of pubic rami (HCC)- (present on admission)  Assessment & Plan  Fractures of the right pubic ramus and inferior greater ring as well as the right acetabulum.  Non-operative management.  Weight bearing status - Touch toe weightbearing RLE.  Pedro Mason MD. Orthopedic Surgeon. [facility].    No contraindication to deep vein thrombosis (DVT) prophylaxis- (present on admission)  Assessment & Plan  VTE prophylaxis initially contraindicated secondary to elevated bleeding risk.  Interval Initiation of VTE Prophylaxis: 9/2 Prophylactic dose enoxaparin 30 mg BID initiated.     Hypertension- (present on admission)  Assessment & Plan  Chronic condition treated with metoprolol, amlodipine, triamterene-hctz and lisinopril.    Hyperglycemia- (present on admission)  Assessment & Plan  No history of diabetes.  Hemoglobin A1C 5.1    Hyperlipidemia- (present on admission)  Assessment & Plan  Chronic condition treated with atorvastatin.  Resumed maintenance medication on admission.    Elevated serum creatinine-resolved as of 9/4/2023, (present on admission)  Assessment & Plan  9/2 Creatinine 1.49  - 1 liter bolus. MIVF initiated.  9/3 Creatinine 1.71.  - 1 liter bolus.  9/4 Creatinine normalized.      DISPOSITION: Discharged home on 9/5/23. The patient and family were counseled and questions were answered. Specifically, signs and symptoms of infection, respiratory decompensation, and persistent or worsening pain were discussed and the patient agrees to seek medical attention if any of these develop.    DISCHARGE MEDICATIONS:  The patients controlled substance history was reviewed and a controlled substance use informed consent (if applicable)  was provided by [facility] and the patient has been prescribed.     Medication List        START taking these medications        Instructions   acetaminophen 325 MG Tabs  Commonly known as: Tylenol   Take 2 Tablets by mouth every four hours as needed for Mild Pain or Moderate Pain.  Dose: 650 mg     docusate sodium 100 MG Caps   Take 100 mg by mouth 2 times a day. Administer while taking oxycodone to prevent constipation.  Dose: 100 mg     gabapentin 100 MG Caps  Commonly known as: Neurontin   Take 1 Capsule by mouth every 8 hours for 7 days.  Dose: 100 mg     methocarbamol 500 MG Tabs  Commonly known as: Robaxin   Take 1 Tablet by mouth 3 times a day for 7 days.  Dose: 500 mg     oxyCODONE immediate-release 5 MG Tabs  Commonly known as: Roxicodone   Take 1 Tablet by mouth every four hours as needed for Severe Pain for up to 7 days. Indications: Acute Pain  Dose: 5 mg     polyethylene glycol/lytes 17 g Pack  Commonly known as: Miralax   Take 1 Packet by mouth every day. Administer while taking oxycodone to prevent constipation.  Dose: 17 g            CONTINUE taking these medications        Instructions   amLODIPine 10 MG Tabs  Commonly known as: Norvasc   Take 10 mg by mouth every day.  Dose: 10 mg     atorvastatin 40 MG Tabs  Commonly known as: Lipitor   Take 40 mg by mouth every day.  Dose: 40 mg     * lisinopril 10 MG Tabs  Commonly known as: Prinivil   Take 10 mg by mouth every evening.  Dose: 10 mg     * lisinopril 20 MG Tabs  Commonly known as: Prinivil   Take 20 mg by mouth every morning.  Dose: 20 mg     metoprolol tartrate 50 MG Tabs  Commonly known as: Lopressor   Take 50 mg by mouth every morning.  Dose: 50 mg     multivitamin Tabs   Take 1 Tablet by mouth every day.  Dose: 1 Tablet     triamterene/hctz 37.5-25 MG Caps  Commonly known as: Maxzide-25/Dyazide   Take 1 Capsule by mouth every morning.  Dose: 1 Capsule           * This list has 2 medication(s) that are the same as other  medications prescribed for you. Read the directions carefully, and ask your doctor or other care provider to review them with you.                  ACTIVITY: right lower extremity with toe touch weight bearing precautions    DIET: Regular    FOLLOW UP:  Pedro Mason M.D.  [address] [facility] [id number] [phone]    Schedule an appointment as soon as possible for a visit  Follow up with orthopedic surgeon Dr. Mason in 1 week    PCP    Schedule an appointment as soon as possible for a visit in 1 week(s)        TIME SPENT ON DISCHARGE: 37 minutes      ____________________________________________  DIEGO Talamantes    DD: 9/5/2023 10:56 AM

## 2023-09-05 NOTE — THERAPY
Physical Therapy   Daily Treatment     Patient Name: Annetta Ohara  Age:  67 y.o., Sex:  male  Medical Record #: [id number]  Today's Date: 9/5/2023     Precautions  Precautions: Fall Risk;Toe Touch Weight Bearing Right Lower Extremity  Comments: ROM as tolerated R LE-right-sided acetabular and pelvic fractures    Assessment    Pt with improved amb with FWW with TTWb R LE and able to amb short distance with crutches. Pt able to go up and down steps with crutch and rail. He said his son will install rail/s at steps at the front of his home and possibly into his den area. Provided pt with info on where to rent or purchase a manual W/C or transport W/C if needed and educated on brake management and safety. Education on ok to do AROM as tolerated. Anticipate pt will benefit from home with home health and home with family assistance upon DC from hospital.       Plan    Treatment Plan Status: Continue Current Treatment Plan  Type of Treatment: Bed Mobility, Stair Training, Therapeutic Activities, Therapeutic Exercise  Treatment Frequency: 4 Times per Week  Treatment Duration: Until Therapy Goals Met    DC Equipment Recommendations: Front-Wheel Walker (transport chair for community use with assistance of family- provided pt with handouts with info about W/C)  Discharge Recommendations: Recommend home health for continued physical therapy services      Subjective    Pt motivated for therapy.      Objective       09/05/23 0905   Precautions   Precautions Fall Risk;Toe Touch Weight Bearing Right Lower Extremity   Comments ROM as tolerated R LE-right-sided acetabular and pelvic fractures   Pain 0 - 10 Group   Location Pelvis   Location Orientation Left;Right  (right >left)   Therapist Pain Assessment During Activity   Cognition    Cognition / Consciousness WDL   Level of Consciousness Alert   Other Treatments   Other Treatments Provided education on DME options, ROM, stairs, railings set up for home, W/C options   Balance   Sitting  Balance (Static) Good   Sitting Balance (Dynamic) Good   Standing Balance (Static) Fair +   Standing Balance (Dynamic) Fair +   Weight Shift Sitting Good   Weight Shift Standing Good  (weight shifting on UEs and L LE)   Skilled Intervention Verbal Cuing;Compensatory Strategies   Comments stnading balance assessed with FWW, crtuches and stairs with railing and crutch   Bed Mobility    Sit to Supine Minimal Assist  (R LE into bed)   Scooting Supervised   Skilled Intervention Facilitation   Comments in chair prior to treatment   Gait Analysis   Gait Level Of Assist Supervised   Assistive Device Front Wheel Walker   Distance (Feet) 20   # of Times Distance was Traveled 3   Deviation   (hopping, TTWB R LE)   # of Stairs Climbed 4  (up./down 3 inch step with walker, up/down 6 inch step x 3with rail and crutch)   Level of Assist with Stairs Contact Guard Assist   Weight Bearing Status TTWB RLE  (pt able to maintain TTWB R LE)   Skilled Intervention Facilitation;Verbal Cuing   Functional Mobility   Sit to Stand Supervised   Bed, Chair, Wheelchair Transfer Supervised   Toilet Transfers Supervised   Transfer Method Stand Step   Mobility with FWW and with crutches   Skilled Intervention Verbal Cuing   How much difficulty does the patient currently have...   Turning over in bed (including adjusting bedclothes, sheets and blankets)? 3   Sitting down on and standing up from a chair with arms (e.g., wheelchair, bedside commode, etc.) 3   Moving from lying on back to sitting on the side of the bed? 2   How much help from another person does the patient currently need...   Moving to and from a bed to a chair (including a wheelchair)? 3   Need to walk in a hospital room? 3   Activity Tolerance   Comments amb limited by pain   Patient / Family Goals    Patient / Family Goal #1 go home   Goal #1 Outcome Progressing as expected   Short Term Goals    Short Term Goal # 1 Patient will move supine <> sitting EOB without bed features with  supervision within 6tx in order to get in/out of bed   Goal Outcome # 1 Progressing as expected   Short Term Goal # 2 Patient will move sitting <> standing with FWW and supervision within 6tx in order to initiate transfers and gait   Goal Outcome # 2 Goal met   Short Term Goal # 3 Patient will ambulate 50ft with FWW and supervision within 6tx in order to access environment   Goal Outcome # 3 Goal met   Short Term Goal # 4 Patient will self propel WC 150ft with supervision within 6tx in order to access community   Goal Outcome # 4 Goal not met   Short Term Goal # 5 Patient will ascend/descend 3 steps with supervision within 6tx in order to enter/exit home   Goal Outcome # 5 Progressing as expected   Education Group   Education Provided Role of Physical Therapist;Weight Bearing Precautions;Use of Assistive Device;Gait Training;Stair Training   Role of Physical Therapist Patient Response Patient;Family;Acceptance;Explanation;Verbal Demonstration   Gait Training Patient Response Patient;Acceptance;Explanation;Verbal Demonstration;Action Demonstration   Stair Training Patient Response Patient;Acceptance;Explanation;Demonstration;Verbal Demonstration;Action Demonstration   Use of Assistive Device Patient Response Patient;Acceptance;Explanation;Demonstration;Verbal Demonstration;Action Demonstration   Weight Bearing Precautions Patient Response Patient;Family;Acceptance;Explanation;Demonstration;Verbal Demonstration;Action Demonstration   Additional Comments equipment recommendations   Physical Therapy Treatment Plan   Physical Therapy Treatment Plan Continue Current Treatment Plan   Treatment Plan  Bed Mobility;Stair Training;Therapeutic Activities;Therapeutic Exercise   Treatment Frequency 4 Times per Week   Duration Until Therapy Goals Met   Anticipated Discharge Equipment and Recommendations   DC Equipment Recommendations Front-Wheel Walker  (transport chair for community use with assistance of family- provided pt with  handouts with info about W/C)   Discharge Recommendations Recommend home health for continued physical therapy services   Interdisciplinary Plan of Care Collaboration   IDT Collaboration with  Nursing   Patient Position at End of Therapy In Bed;Bed Alarm On;Call Light within Reach;Tray Table within Reach;Phone within Reach

## 2023-09-05 NOTE — DISCHARGE INSTRUCTIONS
- Call or seek medical attention for questions or concerns  - Follow up with Dr. Pedro Mason in 1 week with repeat xrays  - Follow up with primary care provider within one weeks time  - Resume regular diet  - May take over the counter acetaminophen as needed for pain  - Continue daily over the counter stool softener while on narcotics  - No operation of machinery or motorized vehicles while under the influence of narcotics  - No alcohol, marijuana or illicit drug use while under the influence of narcotics  - In the event of a narcotic overdose naloxone (Narcan) is available without a prescription from any [facility] or [facility]  - No swimming, hot tubs, baths or wound submersion until cleared by outpatient provider. May shower  - No contact sports, strenuous activities, or heavy lifting until cleared by outpatient provider  - If respiratory decompensation, persistent or worsening pain, or signs or symptoms of infection occur seek medical attention    Incision Care, Adult  An incision is a surgical cut that is made through your skin. Most incisions are closed after a surgical procedure. Your incision may be closed with stitches (sutures), staples, skin glue, or adhesive strips. You may need to return to your health care provider to have sutures or staples removed. This may occur several days or several weeks after your surgery. Until then, the incision needs to be cared for properly to prevent infection. Follow instructions from your health care provider about how to care for your incision.  Supplies needed:  Soap, water, and a clean hand towel.  Wound cleanser.  A clean bandage (dressing), if needed.  Cream or ointment, if told by your health care provider.  Clean gauze.  How to care for your incision  Cleaning the incision  Ask your health care provider how to clean the incision. This may include:  Wearing medical gloves.  Using mild soap and water, or wound cleanser.  Using a clean gauze to pat the  incision dry after cleaning it.  Dressing changes  Wash your hands with soap and water for at least 20 seconds before and after you change the dressing. If soap and water are not available, use hand .  Do not use disinfectants or antiseptics, such as rubbing alcohol, to clean open wounds unless told by your health care provider.  Change your dressing as told by your health care provider.  Leave sutures, staples, skin glue, or adhesive strips in place. These skin closures may need to stay in place for 2 weeks or longer. If adhesive strip edges start to loosen and curl up, you may trim the loose edges. Do not remove adhesive strips completely unless your health care provider tells you to do that.  Apply cream or ointment. Do this only as told by your health care provider.  Cover the incision with a clean dressing. Ask your health care provider when you can start to leave the incision uncovered.  Checking for infection  Check your incision area every day for signs of infection. Check for:  More redness, swelling, or pain.  More fluid or blood.  New warmth, hardness, or a rash that develops along the incision.  Pus or a bad smell.    Follow these instructions at home  Medicines  Take over-the-counter and prescription medicines only as told by your health care provider.  If you were prescribed an antibiotic medicine, cream, or ointment, take or apply it as told by your health care provider. Do not stop using the antibiotic even if your condition improves.  Eating and drinking  Eat a diet that includes protein, vitamin A, vitamin C, and other nutrient-rich foods to help the wound heal.  Foods rich in protein include meat, fish, eggs, dairy, beans, nuts, and protein supplement drinks.  Foods rich in vitamin A include carrots and dark green, leafy vegetables.  Foods rich in vitamin C include citrus fruits, tomatoes, broccoli, and peppers.  Drink enough fluid to keep your urine pale yellow.  General  instructions    Do not take baths, swim, use a hot tub, or do anything that would put the incision underwater until your health care provider approves. Ask your health care provider if you may take showers. You may only be allowed to take sponge baths.  Limit movement around your incision to promote healing.  Avoid straining, lifting, or exercising for the first 2 weeks after your procedure, or for as long as told by your health care provider.  Return to your normal activities as told by your health care provider. Ask your health care provider what activities are safe for you.  Do not scratch, pick, or scrub the incision. Keep it covered as told by your health care provider.  Protect your incision from the sun when you are outside for the first 6 months, or for as long as told by your health care provider. Cover up the scar area or apply sunscreen that has an SPF of at least 30.  Do not use any products that contain nicotine or tobacco, such as cigarettes, e-cigarettes, and chewing tobacco. These can delay incision healing. If you need help quitting, ask your health care provider.  Keep all follow-up visits. This is important.  Contact a health care provider if:  You have any of these signs of infection:  More redness, swelling, or pain around your incision.  More fluid or blood coming from your incision.  New warmth or hardness around your incision.  Pus or a bad smell coming from your incision.  A rash that develops along the incision.  You feel nauseous or you vomit.  You are dizzy.  Your sutures, staples, skin glue, or adhesive strips come undone.  Your wound gets bigger.  You have a fever.  Get help right away if:  Your incision bleeds through the dressing and the bleeding does not stop with gentle pressure.  The edges of your incision open up and separate.  These symptoms may represent a serious problem that is an emergency. Do not wait to see if the symptoms will go away. Get medical help right away. Call  your local emergency services (911 in the [place] Do not drive yourself to the hospital.  Summary  Follow instructions from your health care provider about how to care for your incision.  Wash your hands with soap and water for at least 20 seconds before and after you change the dressing. If soap and water are not available, use hand .  Check your incision area every day for signs of infection.  Keep all follow-up visits. This is important.  This information is not intended to replace advice given to you by your health care provider. Make sure you discuss any questions you have with your health care provider.  Document Revised: 03/21/2022 Document Reviewed: 03/21/2022  Elsevier Patient Education © 2023 [employer]

## 2023-09-05 NOTE — DISCHARGE PLANNING
[facility] has declined, out of service area. Called Lilian at [facility]. She will approve [facility] (goes to [place].) She also verified that we can refer to [facility] for DME.

## 2023-09-05 NOTE — CARE PLAN
Problem: Pain - Standard  Goal: Alleviation of pain or a reduction in pain to the patient’s comfort goal  Outcome: Progressing     Problem: Knowledge Deficit - Standard  Goal: Patient and family/care givers will demonstrate understanding of plan of care, disease process/condition, diagnostic tests and medications  Outcome: Progressing       The patient is Stable - Low risk of patient condition declining or worsening    Shift Goals  Clinical Goals: Pain control, wheelchair delivery  Patient Goals: Pain control  Family Goals: N/A    Progress made toward(s) clinical / shift goals:  Taught pt 0-10 pain scale and non-pharmacological method of pain management, encouraged to verbalize when in pain. Administered PRN pain meds as needed.  Per physical therapist, wheelchair no longer needed. Walker orders in place and delivered to bedside.     Patient is not progressing towards the following goals:

## 2023-09-05 NOTE — DISCHARGE INSTR - CASE MGT
[facility] has accepted, their phone number is [phone].   New Primary Care physician is Dr. Stiles , he will see pt in his home.

## 2023-09-05 NOTE — CARE PLAN
The patient is Stable - Low risk of patient condition declining or worsening    Shift Goals  Clinical Goals: safety, pain control, nausea  Patient Goals: rest, pain control, discharge tommorrow  Family Goals: N/A    Progress made toward(s) clinical / shift goals:     Problem: Pain - Standard  Goal: Alleviation of pain or a reduction in pain to the patient’s comfort goal  Outcome: Progressing     Problem: Knowledge Deficit - Standard  Goal: Patient and family/care givers will demonstrate understanding of plan of care, disease process/condition, diagnostic tests and medications  Outcome: Progressing     Problem: Skin Integrity  Goal: Skin integrity is maintained or improved  Outcome: Progressing     Problem: Fall Risk  Goal: Patient will remain free from falls  Outcome: Progressing     Patient is not progressing towards the following goals:

## 2025-06-04 NOTE — DISCHARGE PLANNING
Case Management Discharge Planning    Admission Date: 9/1/2023  GMLOS: 2.7  ALOS: 3    6-Clicks ADL Score: 22  6-Clicks Mobility Score: 14  PT and/or OT Eval ordered: Yes  Post-acute Referrals Ordered: Yes  Post-acute Choice Obtained: Yes  Has referral(s) been sent to post-acute provider:  Yes      Anticipated Discharge Dispo: Discharge Disposition: D/T to home under [facility] care in anticipation of covered skilled care (06)    DME Needed: Yes    DME Ordered: Yes, W/C    Action(s) Taken: Updated Provider/Nurse on Discharge Plan, DC Assessment Complete (See below), Choice obtained, and Referral(s) sent    Escalations Completed: None    Medically Clear: No    Next Steps: Pt needs HH and W/C for home. Insurance is [employer], exclusive provider for HH is [facility] (not sure if they service [place]. Will F/U tomorrow.) Will need W/C, will ask PT/OT tomorrow for specifics for W/C. Choice form signed.  .  Barriers to Discharge: Medical clearance, Pending Insurance Authorization, and DME    Is the patient up for discharge tomorrow: No        normal sinus rhythm